# Patient Record
Sex: FEMALE | Race: WHITE | NOT HISPANIC OR LATINO | Employment: OTHER | URBAN - METROPOLITAN AREA
[De-identification: names, ages, dates, MRNs, and addresses within clinical notes are randomized per-mention and may not be internally consistent; named-entity substitution may affect disease eponyms.]

---

## 2017-01-04 DIAGNOSIS — I49.5 SSS (SICK SINUS SYNDROME) (HCC): Primary | ICD-10-CM

## 2017-01-04 RX ORDER — SODIUM CHLORIDE 0.9 % (FLUSH) 0.9 %
1-10 SYRINGE (ML) INJECTION AS NEEDED
Status: CANCELLED | OUTPATIENT
Start: 2017-01-04

## 2017-01-05 DIAGNOSIS — R00.2 PALPITATIONS: Primary | ICD-10-CM

## 2017-01-09 ENCOUNTER — RESULTS ENCOUNTER (OUTPATIENT)
Dept: CARDIOLOGY | Facility: CLINIC | Age: 67
End: 2017-01-09

## 2017-01-09 DIAGNOSIS — I49.5 SSS (SICK SINUS SYNDROME) (HCC): ICD-10-CM

## 2017-01-10 ENCOUNTER — APPOINTMENT (OUTPATIENT)
Dept: PREADMISSION TESTING | Facility: HOSPITAL | Age: 67
End: 2017-01-10

## 2017-01-10 LAB
ANION GAP SERPL CALCULATED.3IONS-SCNC: 5 MMOL/L (ref 3–11)
BUN BLD-MCNC: 16 MG/DL (ref 9–23)
BUN/CREAT SERPL: 20 (ref 7–25)
CALCIUM SPEC-SCNC: 9.9 MG/DL (ref 8.7–10.4)
CHLORIDE SERPL-SCNC: 102 MMOL/L (ref 99–109)
CO2 SERPL-SCNC: 34 MMOL/L (ref 20–31)
CREAT BLD-MCNC: 0.8 MG/DL (ref 0.6–1.3)
DEPRECATED RDW RBC AUTO: 40 FL (ref 37–54)
ERYTHROCYTE [DISTWIDTH] IN BLOOD BY AUTOMATED COUNT: 12.7 % (ref 11.3–14.5)
GFR SERPL CREATININE-BSD FRML MDRD: 72 ML/MIN/1.73
GLUCOSE BLD-MCNC: 295 MG/DL (ref 70–100)
HCT VFR BLD AUTO: 42 % (ref 34.5–44)
HGB BLD-MCNC: 14.2 G/DL (ref 11.5–15.5)
MAGNESIUM SERPL-MCNC: 2.7 MG/DL (ref 1.3–2.7)
MCH RBC QN AUTO: 29.1 PG (ref 27–31)
MCHC RBC AUTO-ENTMCNC: 33.8 G/DL (ref 32–36)
MCV RBC AUTO: 86.1 FL (ref 80–99)
PLATELET # BLD AUTO: 124 10*3/MM3 (ref 150–450)
PMV BLD AUTO: 10.8 FL (ref 6–12)
POTASSIUM BLD-SCNC: 4.3 MMOL/L (ref 3.5–5.5)
RBC # BLD AUTO: 4.88 10*6/MM3 (ref 3.89–5.14)
SODIUM BLD-SCNC: 141 MMOL/L (ref 132–146)
WBC NRBC COR # BLD: 5.21 10*3/MM3 (ref 3.5–10.8)

## 2017-01-10 PROCEDURE — 85027 COMPLETE CBC AUTOMATED: CPT | Performed by: PHYSICIAN ASSISTANT

## 2017-01-10 PROCEDURE — 83735 ASSAY OF MAGNESIUM: CPT | Performed by: PHYSICIAN ASSISTANT

## 2017-01-10 PROCEDURE — 80048 BASIC METABOLIC PNL TOTAL CA: CPT | Performed by: PHYSICIAN ASSISTANT

## 2017-01-10 PROCEDURE — 36415 COLL VENOUS BLD VENIPUNCTURE: CPT | Performed by: PHYSICIAN ASSISTANT

## 2017-01-10 RX ORDER — FLUCONAZOLE 150 MG/1
TABLET ORAL
COMMUNITY
Start: 2016-11-05 | End: 2017-01-10

## 2017-01-10 RX ORDER — CEFDINIR 300 MG/1
CAPSULE ORAL
COMMUNITY
Start: 2016-11-05 | End: 2017-01-10

## 2017-01-10 NOTE — DISCHARGE INSTRUCTIONS
The following instructions given during Pre Admission Testing visit:    Do not eat or drink anything after MN except for sips of water with your a.m. Prescription meds unless otherwise instructed by your physician.    Glasses and jewelry may be worn, but dentures must be removed prior to cath/procedure.    Leave any items you consider valuable at home.    Family members may wait in [facility] waiting area during procedure.    Bring all medications in their original containers the day of procedure.    Bring photo ID and insurance cards on the day of procedure.    Need to make arrangements for transportation prior to discharge.    The following handouts were given:     Heart Cath pathway (if applicable)   Cardiac Cath booklet published by [employer]    OR appropriate [facility] procedure booklet    If applicable, pt instructed to bring CPAP mask and tubing the day of procedure.  PPM book and wipes given

## 2017-01-11 ENCOUNTER — APPOINTMENT (OUTPATIENT)
Dept: CARDIOLOGY | Facility: HOSPITAL | Age: 67
End: 2017-01-11
Attending: INTERNAL MEDICINE

## 2017-01-11 ENCOUNTER — APPOINTMENT (OUTPATIENT)
Dept: PREADMISSION TESTING | Facility: HOSPITAL | Age: 67
End: 2017-01-11

## 2017-01-11 ENCOUNTER — HOSPITAL ENCOUNTER (OUTPATIENT)
Dept: CARDIOLOGY | Facility: HOSPITAL | Age: 67
Discharge: HOME | End: 2017-01-11
Attending: INTERNAL MEDICINE | Admitting: INTERNAL MEDICINE

## 2017-01-11 VITALS
SYSTOLIC BLOOD PRESSURE: 122 MMHG | BODY MASS INDEX: 32.1 KG/M2 | DIASTOLIC BLOOD PRESSURE: 59 MMHG | HEIGHT: 64 IN | WEIGHT: 188 LBS

## 2017-01-11 DIAGNOSIS — I48.0 PAROXYSMAL ATRIAL FIBRILLATION (HCC): ICD-10-CM

## 2017-01-11 LAB
BH CV ECHO MEAS - AO ROOT AREA (BSA CORRECTED): 1.3
BH CV ECHO MEAS - AO ROOT AREA: 4.9 CM^2
BH CV ECHO MEAS - AO ROOT DIAM: 2.5 CM
BH CV ECHO MEAS - BSA(HAYCOCK): 2 M^2
BH CV ECHO MEAS - BSA: 1.9 M^2
BH CV ECHO MEAS - BZI_BMI: 32.3 KILOGRAMS/M^2
BH CV ECHO MEAS - BZI_METRIC_HEIGHT: 162.6 CM
BH CV ECHO MEAS - BZI_METRIC_WEIGHT: 85.3 KG
BH CV ECHO MEAS - CONTRAST EF (2CH): 51.5 ML/M^2
BH CV ECHO MEAS - CONTRAST EF 4CH: 53.1 ML/M^2
BH CV ECHO MEAS - EDV(CUBED): 56.2 ML
BH CV ECHO MEAS - EDV(MOD-SP2): 33 ML
BH CV ECHO MEAS - EDV(MOD-SP4): 32 ML
BH CV ECHO MEAS - EDV(TEICH): 63.1 ML
BH CV ECHO MEAS - EF(CUBED): 63.8 %
BH CV ECHO MEAS - EF(MOD-SP2): 51.5 %
BH CV ECHO MEAS - EF(MOD-SP4): 53.1 %
BH CV ECHO MEAS - EF(TEICH): 56 %
BH CV ECHO MEAS - ESV(CUBED): 20.3 ML
BH CV ECHO MEAS - ESV(MOD-SP2): 16 ML
BH CV ECHO MEAS - ESV(MOD-SP4): 15 ML
BH CV ECHO MEAS - ESV(TEICH): 27.8 ML
BH CV ECHO MEAS - FS: 28.7 %
BH CV ECHO MEAS - IVS/LVPW: 1.1
BH CV ECHO MEAS - IVSD: 1.4 CM
BH CV ECHO MEAS - LA DIMENSION: 3.3 CM
BH CV ECHO MEAS - LA/AO: 1.3
BH CV ECHO MEAS - LAT PEAK E' VEL: 7.9 CM/SEC
BH CV ECHO MEAS - LV DIASTOLIC VOL/BSA (35-75): 16.8 ML/M^2
BH CV ECHO MEAS - LV MASS(C)D: 186.6 GRAMS
BH CV ECHO MEAS - LV MASS(C)DI: 97.9 GRAMS/M^2
BH CV ECHO MEAS - LV SYSTOLIC VOL/BSA (12-30): 7.9 ML/M^2
BH CV ECHO MEAS - LVIDD: 3.8 CM
BH CV ECHO MEAS - LVIDS: 2.7 CM
BH CV ECHO MEAS - LVLD AP2: 6.3 CM
BH CV ECHO MEAS - LVLD AP4: 5.7 CM
BH CV ECHO MEAS - LVLS AP2: 5.3 CM
BH CV ECHO MEAS - LVLS AP4: 4.8 CM
BH CV ECHO MEAS - LVPWD: 1.4 CM
BH CV ECHO MEAS - MED PEAK E' VEL: 5 CM/SEC
BH CV ECHO MEAS - MV A MAX VEL: 84.4 CM/SEC
BH CV ECHO MEAS - MV DEC TIME: 0.23 SEC
BH CV ECHO MEAS - MV E MAX VEL: 64.7 CM/SEC
BH CV ECHO MEAS - MV E/A: 0.77
BH CV ECHO MEAS - PA ACC SLOPE: 1025 CM/SEC^2
BH CV ECHO MEAS - PA ACC TIME: 0.09 SEC
BH CV ECHO MEAS - PA PR(ACCEL): 37.6 MMHG
BH CV ECHO MEAS - RAP SYSTOLE: 3 MMHG
BH CV ECHO MEAS - RVDD: 2.6 CM
BH CV ECHO MEAS - RVSP: 23.6 MMHG
BH CV ECHO MEAS - SI(CUBED): 18.8 ML/M^2
BH CV ECHO MEAS - SI(MOD-SP2): 8.9 ML/M^2
BH CV ECHO MEAS - SI(MOD-SP4): 8.9 ML/M^2
BH CV ECHO MEAS - SI(TEICH): 18.6 ML/M^2
BH CV ECHO MEAS - SV(CUBED): 35.8 ML
BH CV ECHO MEAS - SV(MOD-SP2): 17 ML
BH CV ECHO MEAS - SV(MOD-SP4): 17 ML
BH CV ECHO MEAS - SV(TEICH): 35.4 ML
BH CV ECHO MEAS - TAPSE (>1.6): 1.9 CM2
BH CV ECHO MEAS - TR MAX VEL: 227 CM/SEC
BH CV XLRA - RV BASE: 2.7 CM
BH CV XLRA - RV LENGTH: 5.5 CM
BH CV XLRA - RV MID: 2 CM
BH CV XLRA - TDI S': 12.8 CM/SEC
LEFT ATRIUM VOLUME INDEX: 24.6 ML/M2
LV EF 2D ECHO EST: 58 %

## 2017-01-11 PROCEDURE — 93306 TTE W/DOPPLER COMPLETE: CPT | Performed by: INTERNAL MEDICINE

## 2017-01-11 PROCEDURE — 93306 TTE W/DOPPLER COMPLETE: CPT

## 2017-01-12 ENCOUNTER — APPOINTMENT (OUTPATIENT)
Dept: CARDIOLOGY | Facility: HOSPITAL | Age: 67
End: 2017-01-12

## 2017-01-12 ENCOUNTER — HOSPITAL ENCOUNTER (OUTPATIENT)
Facility: HOSPITAL | Age: 67
Setting detail: OBSERVATION
Discharge: HOME | End: 2017-01-13
Attending: INTERNAL MEDICINE | Admitting: INTERNAL MEDICINE

## 2017-01-12 DIAGNOSIS — I49.5 SSS (SICK SINUS SYNDROME) (HCC): ICD-10-CM

## 2017-01-12 DIAGNOSIS — I48.0 PAROXYSMAL ATRIAL FIBRILLATION (HCC): ICD-10-CM

## 2017-01-12 LAB — HBA1C MFR BLD: 9.9 % (ref 4.8–5.6)

## 2017-01-12 PROCEDURE — C1785 PMKR, DUAL, RATE-RESP: HCPCS | Performed by: INTERNAL MEDICINE

## 2017-01-12 PROCEDURE — 99152 MOD SED SAME PHYS/QHP 5/>YRS: CPT | Performed by: INTERNAL MEDICINE

## 2017-01-12 PROCEDURE — 25010000003 CEFAZOLIN IN DEXTROSE 2-4 GM/100ML-% SOLUTION: Performed by: INTERNAL MEDICINE

## 2017-01-12 PROCEDURE — 33208 INSRT HEART PM ATRIAL & VENT: CPT | Performed by: INTERNAL MEDICINE

## 2017-01-12 PROCEDURE — C1892 INTRO/SHEATH,FIXED,PEEL-AWAY: HCPCS | Performed by: INTERNAL MEDICINE

## 2017-01-12 PROCEDURE — 25010000002 FENTANYL CITRATE (PF) 100 MCG/2ML SOLUTION: Performed by: INTERNAL MEDICINE

## 2017-01-12 PROCEDURE — 83036 HEMOGLOBIN GLYCOSYLATED A1C: CPT | Performed by: PHYSICIAN ASSISTANT

## 2017-01-12 PROCEDURE — 25010000003 CEFAZOLIN IN DEXTROSE 2-4 GM/100ML-% SOLUTION: Performed by: PHYSICIAN ASSISTANT

## 2017-01-12 PROCEDURE — 25010000002 ONDANSETRON PER 1 MG: Performed by: INTERNAL MEDICINE

## 2017-01-12 PROCEDURE — 93010 ELECTROCARDIOGRAM REPORT: CPT | Performed by: INTERNAL MEDICINE

## 2017-01-12 PROCEDURE — 25010000002 HEPARIN (PORCINE) PER 1000 UNITS: Performed by: INTERNAL MEDICINE

## 2017-01-12 PROCEDURE — A4565 SLINGS: HCPCS | Performed by: INTERNAL MEDICINE

## 2017-01-12 PROCEDURE — 99153 MOD SED SAME PHYS/QHP EA: CPT | Performed by: INTERNAL MEDICINE

## 2017-01-12 PROCEDURE — G0378 HOSPITAL OBSERVATION PER HR: HCPCS

## 2017-01-12 PROCEDURE — 0 IOPAMIDOL PER 1 ML: Performed by: INTERNAL MEDICINE

## 2017-01-12 PROCEDURE — C1898 LEAD, PMKR, OTHER THAN TRANS: HCPCS | Performed by: INTERNAL MEDICINE

## 2017-01-12 PROCEDURE — 25010000002 MIDAZOLAM PER 1 MG: Performed by: INTERNAL MEDICINE

## 2017-01-12 PROCEDURE — 93005 ELECTROCARDIOGRAM TRACING: CPT | Performed by: INTERNAL MEDICINE

## 2017-01-12 DEVICE — STEROX BIPOLAR IS-1 ATRIAL/VENTRICULAR
Type: IMPLANTABLE DEVICE | Status: FUNCTIONAL
Brand: FINELINE® II EZ STEROX

## 2017-01-12 DEVICE — IMPLANTABLE DEVICE: Type: IMPLANTABLE DEVICE | Status: FUNCTIONAL

## 2017-01-12 DEVICE — PACEMAKER
Type: IMPLANTABLE DEVICE | Status: FUNCTIONAL
Brand: ESSENTIO™ MRI DR

## 2017-01-12 RX ORDER — SODIUM CHLORIDE 0.9 % (FLUSH) 0.9 %
1-10 SYRINGE (ML) INJECTION AS NEEDED
Status: DISCONTINUED | OUTPATIENT
Start: 2017-01-12 | End: 2017-01-13 | Stop reason: HOSPADM

## 2017-01-12 RX ORDER — CEFAZOLIN SODIUM 2 G/100ML
2 INJECTION, SOLUTION INTRAVENOUS EVERY 8 HOURS
Status: COMPLETED | OUTPATIENT
Start: 2017-01-12 | End: 2017-01-13

## 2017-01-12 RX ORDER — ACETAMINOPHEN 325 MG/1
650 TABLET ORAL EVERY 4 HOURS PRN
Status: DISCONTINUED | OUTPATIENT
Start: 2017-01-12 | End: 2017-01-13 | Stop reason: HOSPADM

## 2017-01-12 RX ORDER — ONDANSETRON 2 MG/ML
INJECTION INTRAMUSCULAR; INTRAVENOUS AS NEEDED
Status: DISCONTINUED | OUTPATIENT
Start: 2017-01-12 | End: 2017-01-12 | Stop reason: HOSPADM

## 2017-01-12 RX ORDER — MIDAZOLAM HYDROCHLORIDE 1 MG/ML
INJECTION INTRAMUSCULAR; INTRAVENOUS AS NEEDED
Status: DISCONTINUED | OUTPATIENT
Start: 2017-01-12 | End: 2017-01-12 | Stop reason: HOSPADM

## 2017-01-12 RX ORDER — ONDANSETRON 2 MG/ML
4 INJECTION INTRAMUSCULAR; INTRAVENOUS EVERY 6 HOURS PRN
Status: DISCONTINUED | OUTPATIENT
Start: 2017-01-12 | End: 2017-01-13 | Stop reason: HOSPADM

## 2017-01-12 RX ORDER — FENTANYL CITRATE 50 UG/ML
INJECTION, SOLUTION INTRAMUSCULAR; INTRAVENOUS AS NEEDED
Status: DISCONTINUED | OUTPATIENT
Start: 2017-01-12 | End: 2017-01-12 | Stop reason: HOSPADM

## 2017-01-12 RX ORDER — SODIUM CHLORIDE 9 MG/ML
INJECTION, SOLUTION INTRAVENOUS CONTINUOUS PRN
Status: DISCONTINUED | OUTPATIENT
Start: 2017-01-12 | End: 2017-01-12 | Stop reason: HOSPADM

## 2017-01-12 RX ORDER — PANTOPRAZOLE SODIUM 40 MG/1
40 TABLET, DELAYED RELEASE ORAL
Status: DISCONTINUED | OUTPATIENT
Start: 2017-01-12 | End: 2017-01-13 | Stop reason: HOSPADM

## 2017-01-12 RX ORDER — LIDOCAINE HYDROCHLORIDE 10 MG/ML
INJECTION, SOLUTION INFILTRATION; PERINEURAL AS NEEDED
Status: DISCONTINUED | OUTPATIENT
Start: 2017-01-12 | End: 2017-01-12 | Stop reason: HOSPADM

## 2017-01-12 RX ORDER — BUPIVACAINE HYDROCHLORIDE 5 MG/ML
INJECTION, SOLUTION EPIDURAL; INTRACAUDAL AS NEEDED
Status: DISCONTINUED | OUTPATIENT
Start: 2017-01-12 | End: 2017-01-12 | Stop reason: HOSPADM

## 2017-01-12 RX ORDER — OXYCODONE HYDROCHLORIDE AND ACETAMINOPHEN 5; 325 MG/1; MG/1
1 TABLET ORAL EVERY 4 HOURS PRN
Status: DISCONTINUED | OUTPATIENT
Start: 2017-01-12 | End: 2017-01-13 | Stop reason: HOSPADM

## 2017-01-12 RX ORDER — NAPROXEN SODIUM 220 MG
440 TABLET ORAL 2 TIMES DAILY PRN
COMMUNITY
End: 2018-05-14

## 2017-01-12 RX ORDER — CEFAZOLIN SODIUM 2 G/100ML
2 INJECTION, SOLUTION INTRAVENOUS
Status: COMPLETED | OUTPATIENT
Start: 2017-01-12 | End: 2017-01-12

## 2017-01-12 RX ORDER — SODIUM CHLORIDE 0.9 % (FLUSH) 0.9 %
1-10 SYRINGE (ML) INJECTION AS NEEDED
Status: DISCONTINUED | OUTPATIENT
Start: 2017-01-12 | End: 2017-01-12 | Stop reason: HOSPADM

## 2017-01-12 RX ORDER — ASPIRIN 81 MG/1
81 TABLET ORAL DAILY
Status: DISCONTINUED | OUTPATIENT
Start: 2017-01-12 | End: 2017-01-13 | Stop reason: HOSPADM

## 2017-01-12 RX ADMIN — ACETAMINOPHEN 650 MG: 325 TABLET, FILM COATED ORAL at 20:38

## 2017-01-12 RX ADMIN — CEFAZOLIN SODIUM 2 G: 2 INJECTION, SOLUTION INTRAVENOUS at 09:17

## 2017-01-12 RX ADMIN — PANTOPRAZOLE SODIUM 40 MG: 40 TABLET, DELAYED RELEASE ORAL at 08:15

## 2017-01-12 RX ADMIN — ACETAMINOPHEN 650 MG: 325 TABLET, FILM COATED ORAL at 11:23

## 2017-01-12 RX ADMIN — CEFAZOLIN SODIUM 2 G: 2 INJECTION, SOLUTION INTRAVENOUS at 18:51

## 2017-01-12 NOTE — H&P (VIEW-ONLY)
"Angela BALL [place]  1950  [phone]      12/28/2016    [facility] CARDIOLOGY     Fransico Baez MD  [address] [place] [address] [address] [place] [address]    Chief Complaint   Patient presents with   • Atrial Fibrillation       Problem List:   1. Paroxysmal atrial fibrillation/atrial tachycardia with sick sinus syndrome, bradycardia in the past  a.  Onset of atrial fibrillation in 2004.   b. Underwent trial of multiple medications, which were discontinued due to intolerance or unresponsiveness.   c. Electrophysiology study by Dr. Mayen. 01/20/2005, showing evidence of focal epicardial, atrial  fibrillation emanating from the common pulmonary veins and probably of 1 of the tributaries. Subsequently, ablation procedure was performed, but was unsuccessful according to the patient.  Complication with procedure, resulting in cardiac tamponade.   Patient is status post pericardiocentesis.    d. Recurrence of bouts of paroxysmal atrial fibrillation recently with good response to Cardizem.   e. 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 There was transient complete heart  block with normalization of conduction at the time of the ablation.  Patient currently now in normal sinus rhythm with completely normalization of conduction with HI interval at 130 ms.     f. Event Monitor 11/19 to 12/29 with epiosdes of mobitz II with no prolongation of PP prior to the dizziness spells.   2. Supraventricular tachycardia, rate 190, symptomatic, November 2015:  a. Status post AVNRT, common type ablation on 03/24/2016, with transient complete heart block, but normalized. Prior to this procedure 1 st degree AV block    3. Frequent short episodes of SVT presenting with a \"weak spells.\"   4. Chest pain, negative emergency  room evaluation in 2014.     5. Recent diagnosis of hypertension.   6. Dyslipidemia/hypertriglyceridemia.   7. Family history of heart disease.   8. GERD: " "  a. Status post Nissen fundoplication x2, with resultant esophageal narrowing.    9. Chronic cough, felt to be related to aspiration.   10. Surgical history:   a. Hysterectomy at the age of 30.   b. Nissen fundoplication x2, 10 years ago and repeated 5 years ago.   c. Right leg lipoma resection x7.     Allergies  Allergies   Allergen Reactions   • Codeine    • Dilaudid [Hydromorphone Hcl] Hallucinations   • Percocet [Oxycodone-Acetaminophen] Hallucinations   • Sulfa Antibiotics        Current Medications    Current Outpatient Prescriptions:   •  aspirin 81 MG EC tablet, Take 81 mg by mouth daily., Disp: , Rfl:   •  ibuprofen (ADVIL,MOTRIN) 200 MG tablet, Take 200 mg by mouth Every 6 (Six) Hours As Needed for mild pain (1-3)., Disp: , Rfl:   •  omeprazole (priLOSEC) 40 MG capsule, Take 40 mg by mouth Daily. Make take 1 additional tablet if needed, Disp: , Rfl:     History of Present Illness   HPI    Pt presents for follow up of PAF s/p ablation, AVNRT s/p ablation  With continued palpitations.  Since we last saw the pt she was in the ER at [facility] with palpitations, dizziness and and Mobitz Type II documented on EKG Nov 2 nd with no prolongation of PP prior to blocked beats- she subsequently had an event monitor placed for 30 days. She is having the spells several days / month. She has not truly  passed out. Complaining of dizziness and fatigue. No chest pain, syncope, fever, chills and night sweats. No recent ER visits since Nov 2nd.     ROS:  General:  Denies fatigue, weight gain or loss  Cardiovascular:  Denies CP, PND, syncope, near syncope, edema or + palpitations.  Pulmonary:  Denies RIOS, cough, or wheezing      Vitals:    12/28/16 1036   BP: 128/78   BP Location: Left arm   Patient Position: Sitting   Pulse: 82   Weight: 192 lb (87.1 kg)   Height: 64\" (162.6 cm)     PE:  NAD  Neck: no JVD, no carotid bruits, no TM  Heart RRR, NL S1, S2, S4 present, no rubs, murmurs, skipped beats  Lungs: " CTA  Abd: soft, non-tender, NL BS  Ext: No musculoskeletal deformities    Diagnostic Data:  Procedures     EKG in the past: NSR with mobitz II block with no PP prolongation prior with symptoms of dizziness, fatigue.     1. Paroxysmal atrial fibrillation s/p PVA   2. Essential hypertension    3. SVT (supraventricular tachycardia) s/p AVNRT ablation in the past   4. Mobitz type II atrioventricular block symptomatic      Plan:  1) AF s/p ablation 2005- no recurrence   2) AVNRT- s/p ablation 3/2016 - no recurrence  3) Mobitz type II documented on Event Monitor and EKG with symptoms of dizziness and worsening fatigue, weakness on no AV blaise agents and no AAD. Normal EF in the past. I think best to proceed with a DDD PPM to help with her symptomatic high degree AV block causing symptomatic bradycardia. The risks, benefits, and alternatives of the procedure have been reviewed and the patient wishes to proceed.         Scribed for Jhon Vivas DO by MARYJANE Maynard. 12/28/2016  11:32 AM      IJhon have reviewed the note in full and agree with all aspects of the above including physical exam, assessment, labs and plan with changes made accordingly.     Jhon Vivas DO  12/28/16  11:38 AM

## 2017-01-12 NOTE — IP AVS SNAPSHOT
AFTER VISIT SUMMARY             Angela Tovar           About your hospitalization     You were admitted on:  January 12, 2017 You last received care in the:  [facility]       Procedures & Surgeries      Procedure(s) (LRB):  Pacemaker DC new (N/A)     1/12/2017     Surgeon(s):  Jhon Vivas, DO  -------------------      Medications    If you or your caregiver advised us that you are currently taking a medication and that medication is marked below as “Resume”, this simply indicates that we have reviewed those medications to make sure our new therapy recommendations do not interfere.  If you have concerns about medications other than those new ones which we are prescribing today, please consult the physician who prescribed them (or your primary physician).  Our review of your home medications is not meant to indicate that we are directing their use.             Your Medications      CONTINUE taking these medications     aspirin 81 MG EC tablet   Take 81 mg by mouth daily.           ibuprofen 200 MG tablet   Take 200 mg by mouth Every 6 (Six) Hours As Needed for mild pain (1-3).   Commonly known as:  ADVIL,MOTRIN           naproxen sodium 220 MG tablet   Take 440 mg by mouth 2 (Two) Times a Day As Needed for mild pain (1-3).   Commonly known as:  ALEVE           omeprazole 40 MG capsule   Take 40 mg by mouth Daily. Make take 1 additional tablet if needed   Commonly known as:  priLOSEC                      Your Medications      Your Medication List           Morning Noon Evening Bedtime As Needed    aspirin 81 MG EC tablet   Take 81 mg by mouth daily.                                ibuprofen 200 MG tablet   Take 200 mg by mouth Every 6 (Six) Hours As Needed for mild pain (1-3).   Commonly known as:  ADVIL,MOTRIN                                naproxen sodium 220 MG tablet   Take 440 mg by mouth 2 (Two) Times a Day As Needed for mild pain (1-3).   Commonly known as:  ALEVE                                   omeprazole 40 MG capsule   Take 40 mg by mouth Daily. Make take 1 additional tablet if needed   Commonly known as:  priLOSEC                                         Instructions for After Discharge        Discharge References/Attachments     PACEMAKER IMPLANTATION (ENGLISH)       Follow-ups for After Discharge        Follow-up Information     Follow up with Jhon Vivas DO .    Specialties:  Cardiology, Cardiac Electrophysiology    Why:  wound check 7-10 days. Follow-up 10-12 weeks.     Contact information:    [address]  [facility] [address]  [phone]        Scheduled Appointments     Jan 20, 2017 11:30 AM EST   Wound Check with WOUND CHECK   [facility] CARDIOLOGY (--)    [address]  [facility] [address]   [phone]            Mar 27, 2017  1:15 PM EDT   Follow Up with Jhon Vivas DO   [facility] CARDIOLOGY (--)    [address]  [facility] [address]   [phone]           Arrive 15 minutes prior to appointment.              [employer] Signup     Our records indicate that you have an active [employer] account.    You can view your After Visit Summary by going to [employer] and logging in with your [employer] username and password.  If you don't have a [employer] username and password but a parent or guardian has access to your record, the parent or guardian should login with their own [employer] username and password and access your record to view the After Visit Summary.    If you have questions, you can email [email or web address] or call [phone] to talk to our [employer] staff.  Remember, [employer] is NOT to be used for urgent needs.  For medical emergencies, dial 911.           Summary of Your Hospitalization        Reason for Hospitalization     Your primary diagnosis was:  Not on File    Your diagnoses also included:  Atrial Fibrillation (Irregular  Heartbeat)      Care Providers     Provider Service Role Specialty    Jhon Vivas,  Cardiology Attending Provider Cardiology      Your Allergies  Date Reviewed: 1/12/2017    Allergen Reactions    Codeine Nausea And Vomiting         Dilaudid (Hydromorphone Hcl) Hallucinations         Percocet (Oxycodone-Acetaminophen) Hallucinations         Sulfa Antibiotics Rash      Patient Belongings Returned     Document Return of Belongings Flowsheet     Were the patient bedside belongings sent home?   --   Belongings Retrieved from Security & Sent Home   --    Belongings Sent to Safe   --   Medications Retrieved from Pharmacy & Sent Home   --              More Information      Pacemaker Implantation  The heart has its own electrical system, or natural pacemaker, to regulate the heartbeat. Sometimes, the natural pacemaker system of the heart fails and causes the heart to beat too slowly. If this happens, a pacemaker can be surgically placed to help the heart beat at a normal or programmed rate. A pacemaker is a small, battery-powered device that is placed under the skin and is programmed to sense your heartbeats. If your heart rate is lower than the programmed rate, the pacemaker will pace your heart. Parts of a pacemaker include:  · Wires or leads. The leads are placed in the heart and transmit electricity to the heart. The leads are connected to the pulse generator.  · Pulse generator. The pulse generator contains a computer and a memory system. The pulse generator also produces the electrical signal that triggers the heart to beat.  A pacemaker may be placed if:  · You have a slow heartbeat (bradycardia).  · You have fainting (syncope).  · Shortness of breath (dyspnea) due to heart problems.  LET YOUR HEALTH CARE PROVIDER KNOW ABOUT:  · Any allergies you may have.  · All medicines you are taking, including vitamins, herbs, eye drops, creams, and over-the-counter medicines.  · Previous problems you or members of your  family have had with the use of anesthetics.  · Any blood disorders you have.  · Previous surgeries you have had.  · Medical conditions you have.  · Possibility of pregnancy, if this applies.  RISKS AND COMPLICATIONS  Generally, pacemaker implantation is a safe procedure. However, problems can occur and include:  · Bleeding.  · Unable to place the pacemaker under local sedation.  · Infection.  BEFORE THE PROCEDURE  · You will have blood work drawn before the procedure.  · Do not use any tobacco products including cigarettes, chewing tobacco, or electronic cigarettes. If you need help quitting, ask your health care provider.  · Do not eat or drink anything after midnight on the night before the procedure or as directed by your health care provider.  · Ask your health care provider about:  ¨ Changing or stopping your regular medicines. This is especially important if you are taking diabetes medicines or blood thinners.  ¨ Taking medicines such as aspirin and ibuprofen. These medicines can thin your blood. Do not take these medicines before your procedure if your health care provider asks you not to.  · Ask your health care provider if you can take a sip of water with any approved medicines the morning of the procedure.  PROCEDURE   The surgery to place a pacemaker is considered a minimally invasive surgical procedure. It is done under a local anesthetic, which is an injection at the incision site that makes the skin numb. You are also given sedation and pain medicine that makes you drowsy during the procedure.   · An intravenous line (IV) will be started in your hand or arm so sedation and pain medicine can be given during the pacemaker procedure.  · A numbing medicine will be injected into the skin where the pacemaker is to be placed. A small incision will then be made into the skin. The pacemaker is usually placed under the skin near the collarbone.  · After the incision has been made, the leads will be inserted  into a large vein and guided into the heart using X-ray.  · Using the same incision that was used to place the leads, a small pocket will be created under the skin to hold the pulse generator. The leads will then be connected to the pulse generator.  · The incision site will then be closed. A bandage (dressing) is placed over the pacemaker site. The dressing is removed 24-48 hours afterward.  AFTER THE PROCEDURE  · You will be taken to a recovery area after the pacemaker implant. Your vital signs such as blood pressure, heart rate, breathing, and oxygen levels will be monitored.  · A chest X-ray will be done after the pacemaker has been implanted. This is to make sure the pacemaker and leads are in the correct place.     This information is not intended to replace advice given to you by your health care provider. Make sure you discuss any questions you have with your health care provider.     Document Released: 12/08/2003 Document Revised: 01/08/2016 Document Reviewed: 04/23/2013  [employer] Interactive Patient Education ©2016 [employer]         PREVENTING SURGICAL SITE INFECTIONS     Surgical Site Infections FAQs  What is a Surgical Site Infection (SSI)?  A surgical site infection is an infection that occurs after surgery in the part of the body where the surgery took place. Most patients who have surgery do not develop an infection. However, infections develop in about 1 to 3 out of every 100 patients who have surgery.  Some of the common symptoms of a surgical site infection are:  · Redness and pain around the area where you had surgery  · Drainage of cloudy fluid from your surgical wound  · Fever  Can SSIs be treated?  Yes. Most surgical site infections can be treated with antibiotics. The antibiotic given to you depends on the bacteria (germs) causing the infection. Sometimes patients with SSIs also need another surgery to treat the infection.  What are some of the things that hospitals are doing to prevent  SSIs?  To prevent SSIs, doctors, nurses, and other healthcare providers:  · Clean their hands and arms up to their elbows with an antiseptic agent just before the surgery.  · Clean their hands with soap and water or an alcohol-based hand rub before and after caring for each patient.  · May remove some of your hair immediately before your surgery using electric clippers if the hair is in the same area where the procedure will occur. They should not shave you with a razor.  · Wear special hair covers, masks, gowns, and gloves during surgery to keep the surgery area clean.  · Give you antibiotics before your surgery starts. In most cases, you should get antibiotics within 60 minutes before the surgery starts and the antibiotics should be stopped within 24 hours after surgery.  · Clean the skin at the site of your surgery with a special soap that kills germs.  What can I do to help prevent SSIs?  Before your surgery:  · Tell your doctor about other medical problems you may have. Health problems such as allergies, diabetes, and obesity could affect your surgery and your treatment.  · Quit smoking. Patients who smoke get more infections. Talk to your doctor about how you can quit before your surgery.  · Do not shave near where you will have surgery. Shaving with a razor can irritate your skin and make it easier to develop an infection.  At the time of your surgery:  · Speak up if someone tries to shave you with a razor before surgery. Ask why you need to be shaved and talk with your surgeon if you have any concerns.  · Ask if you will get antibiotics before surgery.  After your surgery:  · Make sure that your healthcare providers clean their hands before examining you, either with soap and water or an alcohol-based hand rub.    If you do not see your providers clean their hands, please ask them to do so.  · Family and friends who visit you should not touch the surgical wound or dressings.  · Family and friends should  clean their hands with soap and water or an alcohol-based hand rub before and after visiting you. If you do not see them clean their hands, ask them to clean their hands.  What do I need to do when I go home from the hospital?  · Before you go home, your doctor or nurse should explain everything you need to know about taking care of your wound. Make sure you understand how to care for your wound before you leave the hospital.  · Always clean your hands before and after caring for your wound.  · Before you go home, make sure you know who to contact if you have questions or problems after you get home.  · If you have any symptoms of an infection, such as redness and pain at the surgery site, drainage, or fever, call your doctor immediately.  If you have additional questions, please ask your doctor or nurse.  Developed and co-sponsored by The Society for Healthcare Epidemiology of [place] (SHEA); Infectious Diseases Society of [place] (IDSA); American Hospital Association; Association for Professionals in Infection Control and Epidemiology (APIC); Centers for Disease Control and Prevention (CDC); and The Joint Commission.     This information is not intended to replace advice given to you by your health care provider. Make sure you discuss any questions you have with your health care provider.     Document Released: 12/23/2014 Document Revised: 01/08/2016 Document Reviewed: 03/02/2016  [employer] Interactive Patient Education ©2016 [employer]             SYMPTOMS OF A STROKE    Call 911 or have someone take you to the Emergency Department if you have any of the following:    · Sudden numbness or weakness of your face, arm or leg especially on one side of the body  · Sudden confusion, diffiiculty speaking or trouble understanding   · Changes in your vision or loss of sight in one eye  · Sudden severe headache with no known cause  · sudden dizziness, trouble walking, loss of balance or coordination    It is important to  seek emergency care right away if you have further stroke symptoms. If you get emergency help quickly, the powerful clot-dissolving medicines can reduce the disabilities caused by a stroke.     For more information:    American Stroke Association  5-934-0-STROKE  www.strokeassociation.org           IF YOU SMOKE OR USE TOBACCO PLEASE READ THE FOLLOWING:    Why is smoking bad for me?  Smoking increases the risk of heart disease, lung disease, vascular disease, stroke, and cancer.     If you smoke, STOP!    If you would like more information on quitting smoking, please visit the [employer] website: www.[email or web address]/healthier-together/smoke   This link will provide additional resources including the QUIT line and the Beat the Pack support groups.     For more information:    American Cancer Society  (615) 825-4239    American Heart Association  1-748.594.7733               YOU ARE THE MOST IMPORTANT FACTOR IN YOUR RECOVERY.     Follow all instructions carefully.     I have reviewed my discharge instructions with my nurse, including the following information, if applicable:     Information about my illness and diagnosis   Follow up appointments (including lab draws)   Wound Care   Equipment Needs   Medications (new and continuing) along with side effects   Preventative information such as vaccines and smoking cessations   Diet   Pain   I know when to contact my Doctor's office or seek emergency care      I want my nurse to describe the side effects of my medications: YES NO   If the answer is no, I understand the side effects of my medications: YES NO   My nurse described the side effects of my medications in a way that I could understand: YES NO   I have taken my personal belongings and my own medications with me at discharge: YES NO            I have received this information and my questions have been answered. I have discussed any concerns I see with this plan with the nurse or physician. I  understand these instructions.    Signature of Patient or Responsible Person: _____________________________________    Date: _________________  Time: __________________    Signature of Healthcare Provider: _______________________________________  Date: _________________  Time: __________________

## 2017-01-12 NOTE — INTERVAL H&P NOTE
"  H&P reviewed. The patient was examined and there are no changes to the H&P.    Vitals:    01/12/17 0630 01/12/17 0632   BP: 142/91 151/91   BP Location: Right arm Left arm   Patient Position: Lying Lying   Pulse: 93    Resp: 18    Temp: 98.4 °F (36.9 °C)    TempSrc: Tympanic    SpO2: 95%    Weight: 190 lb 14.7 oz (86.6 kg)    Height: 64\" (162.6 cm)      Results Review:     I reviewed the patient's new clinical results.      Results from last 7 days  Lab Units 01/10/17  1238   WBC 10*3/mm3 5.21   HEMOGLOBIN g/dL 14.2   HEMATOCRIT % 42.0   PLATELETS 10*3/mm3 124*       Results from last 7 days  Lab Units 01/10/17  1238   SODIUM mmol/L 141   POTASSIUM mmol/L 4.3   CHLORIDE mmol/L 102   TOTAL CO2 mmol/L 34.0*   BUN mg/dL 16   CREATININE mg/dL 0.80   CALCIUM mg/dL 9.9   GLUCOSE mg/dL 295*                         A/P  1. AV Block- Mobitz type II, SSS with issues with sinus arrest  - patient will go for DDD PPM today. Risks, benefits, and alternatives have been discussed and patient wishes to proceed.     2. Hyperglycemia   - will check A1c.     Recurrent symptomatic sinus bradycardia due to SSS and intermittent episodes of high degree Av block. Normal EF. Here for DDD PPM.    IJhon have reviewed the note in full and agree with all aspects of the above including physical exam, assessment, labs and plan with changes made accordingly.     Jhon Vivas,   01/12/17  11:06 AM      "

## 2017-01-12 NOTE — Clinical Note
H&P note has been confirmed for the patient. Procedural consent has been signed.  Staff has reviewed the patients labs.

## 2017-01-13 ENCOUNTER — APPOINTMENT (OUTPATIENT)
Dept: GENERAL RADIOLOGY | Facility: HOSPITAL | Age: 67
End: 2017-01-13

## 2017-01-13 VITALS
HEART RATE: 87 BPM | SYSTOLIC BLOOD PRESSURE: 142 MMHG | TEMPERATURE: 98.4 F | WEIGHT: 190.92 LBS | BODY MASS INDEX: 32.59 KG/M2 | HEIGHT: 64 IN | RESPIRATION RATE: 9 BRPM | OXYGEN SATURATION: 94 % | DIASTOLIC BLOOD PRESSURE: 86 MMHG

## 2017-01-13 PROCEDURE — 93005 ELECTROCARDIOGRAM TRACING: CPT | Performed by: INTERNAL MEDICINE

## 2017-01-13 PROCEDURE — 71020 HC CHEST PA AND LATERAL: CPT

## 2017-01-13 PROCEDURE — 93010 ELECTROCARDIOGRAM REPORT: CPT | Performed by: INTERNAL MEDICINE

## 2017-01-13 PROCEDURE — G0378 HOSPITAL OBSERVATION PER HR: HCPCS

## 2017-01-13 PROCEDURE — 99024 POSTOP FOLLOW-UP VISIT: CPT | Performed by: INTERNAL MEDICINE

## 2017-01-13 PROCEDURE — 25010000003 CEFAZOLIN IN DEXTROSE 2-4 GM/100ML-% SOLUTION: Performed by: INTERNAL MEDICINE

## 2017-01-13 RX ADMIN — CEFAZOLIN SODIUM 2 G: 2 INJECTION, SOLUTION INTRAVENOUS at 01:59

## 2017-01-13 RX ADMIN — PANTOPRAZOLE SODIUM 40 MG: 40 TABLET, DELAYED RELEASE ORAL at 05:25

## 2017-01-13 RX ADMIN — ACETAMINOPHEN 650 MG: 325 TABLET, FILM COATED ORAL at 09:22

## 2017-01-13 NOTE — PLAN OF CARE
Problem: Patient Care Overview (Adult)  Goal: Plan of Care Review  Outcome: Ongoing (interventions implemented as appropriate)    01/13/17 0857   Coping/Psychosocial Response Interventions   Plan Of Care Reviewed With patient   Patient Care Overview   Progress improving       Goal: Adult Individualization and Mutuality  Outcome: Ongoing (interventions implemented as appropriate)  Goal: Discharge Needs Assessment  Outcome: Ongoing (interventions implemented as appropriate)    Problem: Cardiac Rhythm Management Device (Adult)  Goal: Signs and Symptoms of Listed Potential Problems Will be Absent or Manageable (Cardiac Rhythm Management Device)  Outcome: Ongoing (interventions implemented as appropriate)

## 2017-01-13 NOTE — PROGRESS NOTES
[facility] at [facility]  Cardiovascular Progress Note  Angela Tovar  2520/1  [id number]  1950    DATE OF ADMISSION: 1/12/2017  DATE OF FOLLOW UP:  1/13/17    Fransico Baez MD    Subjective:     Patient ID: Angela Tovar is a 66 y.o. female.    Chief Complaint: PM implant f/u     Allergies   Allergen Reactions   • Codeine Nausea And Vomiting   • Dilaudid [Hydromorphone Hcl] Hallucinations   • Percocet [Oxycodone-Acetaminophen] Hallucinations   • Sulfa Antibiotics Rash       Current Facility-Administered Medications:   •  acetaminophen (TYLENOL) tablet 650 mg, 650 mg, Oral, Q4H PRN, Jhon Ortega, DO, 650 mg at 01/12/17 2038  •  aspirin EC tablet 81 mg, 81 mg, Oral, Daily, ISAIAH Stanley, 81 mg at 01/12/17 0815  •  ondansetron (ZOFRAN) injection 4 mg, 4 mg, Intravenous, Q6H PRN, Jhon Ortega, DO  •  oxyCODONE-acetaminophen (PERCOCET) 5-325 MG per tablet 1 tablet, 1 tablet, Oral, Q4H PRN, Jhon Ortega, DO  •  pantoprazole (PROTONIX) EC tablet 40 mg, 40 mg, Oral, Q AM, ISAIAH Stanley, 40 mg at 01/13/17 0525  •  sodium chloride 0.9 % flush 1-10 mL, 1-10 mL, Intravenous, PRN, Jhon Ortega, DO    History of Present Illness    Feeling well. Has ambulated without issue this morning. No dizziness, SOA, or chest pain.     Doing well post PPM.     ROS   14 point ROS negative except as outlined in problem list, HPI and other parts of the note.    Procedures       Objective:       Vitals:    01/13/17 0205 01/13/17 0300 01/13/17 0410 01/13/17 0610   BP: 117/65  112/72 108/63   BP Location:       Patient Position:       Pulse:  91 83 81   Resp:       Temp:       TempSrc:       SpO2: 94% 93% 90% 91%   Weight:       Height:           Intake/Output Summary (Last 24 hours) at 01/13/17 0803  Last data filed at 01/13/17 0000   Gross per 24 hour   Intake    360 ml   Output      0 ml   Net    360 ml       GENERAL: Well-developed, well-nourished patient in no acute distress.  HEENT:  Normocephalic, atraumatic, PERRLA. Moist mucous membranes.  NECK: No JVD present at 30°. No carotid bruits auscultated.  LUNGS: Clear to auscultation.  CARDIOVASCULAR: Heart has a regular rate and rhythm. No murmurs, gallops or rubs noted.   ABDOMEN: Soft, nontender. Positive bowel sounds.  MUSCULOSKELETAL: No gross deformities. No clubbing, cyanosis  EXT: pulses intact, no swelling  SKIN: Pink, warm  Neuro: Nonfocal exam. Gait intact  PPM site ok  The patient's old records including ambulatory rhythm recordings (ECGs, Holter/event monitor) were reviewed and discussed.      Lab Review:     Results from last 7 days  Lab Units 01/10/17  1238   SODIUM mmol/L 141   POTASSIUM mmol/L 4.3   CHLORIDE mmol/L 102   TOTAL CO2 mmol/L 34.0*   BUN mg/dL 16   CREATININE mg/dL 0.80   GLUCOSE mg/dL 295*   CALCIUM mg/dL 9.9           Results from last 7 days  Lab Units 01/10/17  1238   WBC 10*3/mm3 5.21   HEMOGLOBIN g/dL 14.2   HEMATOCRIT % 42.0   PLATELETS 10*3/mm3 124*               Results from last 7 days  Lab Units 01/10/17  1238   MAGNESIUM mg/dL 2.7         CXR: good lead placement. No PTX.           Assessment & Plan:     1. Symptomatic Bradycardia due to SSS and high degree AV block  - patient is s/p DDD PPM implant yesterday. CXR looks good. No PTX i can see. Leads in good position.     2. Diabetes Mellitus- new onset   - A1c is 9.9. Informed patient of new diagnosis and she stated it is possible for her to get in with her PCP next week to begin treatment. Counseled on dietary modifications and exercise.     Patient will be discharged home in stable condition. Wound check in 7-10 days and follow-up in 10-12 weeks.     ISAIAH Schroeder  01/13/17  8:03 AM    IJhon have reviewed the note in full and agree with all aspects of the above including physical exam, assessment, labs and plan with changes made accordingly.     Jhon Vivas DO  01/13/17  9:08 AM          EMR Dragon/Transcription disclaimer:  Much of  this encounter note is an electronic transcription/translation of spoken language to printed text. Electronic translation of spoken language may permit erroneous, or at times, nonsensical words or phrases to be inadvertently transcribed. Although I have reviewed the note for such errors, some may still exist.

## 2017-01-13 NOTE — PROGRESS NOTES
Order received for Phase II Cardiac Rehab. Patient does not have a qualifying diagnosis for Phase II Cardiac Rehab. Staff available if additional consult is needed.

## 2017-01-13 NOTE — DISCHARGE INSTRUCTIONS
Instructed not to do any heavy lifting, pushing, pulling, or raising arm above head for next 3-4 weeks.

## 2017-01-20 ENCOUNTER — OFFICE VISIT (OUTPATIENT)
Dept: CARDIOLOGY | Facility: CLINIC | Age: 67
End: 2017-01-20

## 2017-01-20 DIAGNOSIS — I47.1 ATRIAL TACHYCARDIA (HCC): ICD-10-CM

## 2017-01-20 DIAGNOSIS — I47.1 SVT (SUPRAVENTRICULAR TACHYCARDIA) (HCC): Primary | ICD-10-CM

## 2017-01-20 DIAGNOSIS — I48.0 PAROXYSMAL ATRIAL FIBRILLATION (HCC): ICD-10-CM

## 2017-01-20 DIAGNOSIS — I44.1 MOBITZ TYPE II ATRIOVENTRICULAR BLOCK: ICD-10-CM

## 2017-01-20 PROCEDURE — 99024 POSTOP FOLLOW-UP VISIT: CPT | Performed by: INTERNAL MEDICINE

## 2017-01-20 NOTE — PROGRESS NOTES
WOUND CHECK    2017    Angela Tovar, : 1950    WOUND CHECK    B/P: L 136/95(Sitting)  (Standing)     Pulse: 92    Patient has fever: [] Temperature if indicated:98.4     Wound Location:L INFRACLAVICULAR    Dressing Removed [x]        Old Dressing Appearance:  Clean, dry []                 Old, bloody drainage [x]                            Moist, serous drainage []                Moist, thick yellow/green drainage []       Wound Appearance: Redness []                  Drainage []                  Culture obtained []        Color: N/A     Consistency: NONE     Amount: none         Gloves used, wound cleansed with sterile 4x4 and peroxide [x]       MD notified [] MD orders:     Antibiotic started []  If checked, type   Other:     Appointment for follow-up scheduled for 3 months post procedure [x]    Future Appointments  Date Time Provider Department Center   3/27/2017 1:15 PM Jhon Vivas DO [facility] None           ANGE [place], 17      MD Signature:______________________________ Completed By/Date:

## 2017-03-27 ENCOUNTER — OFFICE VISIT (OUTPATIENT)
Dept: CARDIOLOGY | Facility: CLINIC | Age: 67
End: 2017-03-27

## 2017-03-27 VITALS
DIASTOLIC BLOOD PRESSURE: 72 MMHG | HEART RATE: 88 BPM | HEIGHT: 64 IN | BODY MASS INDEX: 30.77 KG/M2 | SYSTOLIC BLOOD PRESSURE: 124 MMHG | WEIGHT: 180.2 LBS

## 2017-03-27 DIAGNOSIS — I47.1 SVT (SUPRAVENTRICULAR TACHYCARDIA) (HCC): Primary | ICD-10-CM

## 2017-03-27 DIAGNOSIS — I44.1 MOBITZ TYPE II ATRIOVENTRICULAR BLOCK: ICD-10-CM

## 2017-03-27 DIAGNOSIS — I48.0 PAROXYSMAL ATRIAL FIBRILLATION (HCC): ICD-10-CM

## 2017-03-27 DIAGNOSIS — I47.1 ATRIAL TACHYCARDIA (HCC): ICD-10-CM

## 2017-03-27 PROCEDURE — 93288 INTERROG EVL PM/LDLS PM IP: CPT | Performed by: INTERNAL MEDICINE

## 2017-03-27 PROCEDURE — 99213 OFFICE O/P EST LOW 20 MIN: CPT | Performed by: PHYSICIAN ASSISTANT

## 2017-03-27 NOTE — PROGRESS NOTES
"Subjective:   Angela Tovar  1950  [phone]      03/27/2017    [facility] CARDI[facility]    Fransico Baez MD  [address] [place] [address] [address] [place] [address]    REFERRING DOCTOR: Ronald Everett MD       Patient ID: Angela Tovar is a 66 y.o. female.    Chief Complaint:   Chief Complaint   Patient presents with   • Pacemaker Check     Problem List:    1. Paroxysmal atrial fibrillation/atrial tachycardia with sick sinus syndrome, bradycardia in the past  a. Onset of atrial fibrillation in 2004.   b. Underwent trial of multiple medications, which were discontinued due to intolerance or unresponsiveness.   c. Electrophysiology study by Dr. Mayen. 01/20/2005, showing evidence of focal epicardial, atrial fibrillation emanating from the common pulmonary veins and probably of 1 of the tributaries. Subsequently, ablation procedure was performed, but was unsuccessful according to the patient. Complication with procedure, resulting in cardiac tamponade. Patient is status post pericardiocentesis.   d. Recurrence of bouts of paroxysmal atrial fibrillation recently with good response to Cardizem.   e. 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 There was transient complete heart block with normalization of conduction at the time of the ablation. Patient currently now in normal sinus rhythm with completely normalization of conduction with KS interval at 130 ms.   f. Event Monitor 11/19 to 12/29 with epiosdes of mobitz II with no prolongation of PP prior to the dizziness spells.   2. Supraventricular tachycardia, rate 190, symptomatic, November 2015:  a. Status post AVNRT, common type ablation on 03/24/2016, with transient complete heart block, but normalized. Prior to this procedure 1 st degree AV block   3. Frequent short episodes of SVT presenting with a \"weak spells.\"   4. Chest pain, negative emergency room evaluation in 2014.   5. Recent diagnosis of " hypertension.   6. Dyslipidemia/hypertriglyceridemia.   7. Family history of heart disease.   8. GERD:   a. Status post Nissen fundoplication x2, with resultant esophageal narrowing.   9. Chronic cough, felt to be related to aspiration.   10. Surgical history:   a. Hysterectomy at the age of 30.   b. Nissen fundoplication x2, 10 years ago and repeated 5 years ago.   c. Right leg lipoma resection x7.     Allergies   Allergen Reactions   • Codeine Nausea And Vomiting   • Dilaudid [Hydromorphone Hcl] Hallucinations   • Percocet [Oxycodone-Acetaminophen] Hallucinations   • Sulfa Antibiotics Rash       Current Outpatient Prescriptions:   •  aspirin 81 MG EC tablet, Take 81 mg by mouth daily., Disp: , Rfl:   •  ibuprofen (ADVIL,MOTRIN) 200 MG tablet, Take 200 mg by mouth Every 6 (Six) Hours As Needed for mild pain (1-3)., Disp: , Rfl:   •  naproxen sodium (ALEVE) 220 MG tablet, Take 440 mg by mouth 2 (Two) Times a Day As Needed for mild pain (1-3)., Disp: , Rfl:   •  omeprazole (priLOSEC) 40 MG capsule, Take 40 mg by mouth Daily. Make take 1 additional tablet if needed, Disp: , Rfl:     History of Present Illness    Patient presents today for followup of her hx of afib, SVT, and AV block s/p DDD PPM. She has been doing well. Has lost 20 lbs since device check after finding out she was diabetic. Has controlled her blood glucose with diet only. Can occasionally feel the PM pace, but no palpitations. No longer having to take daytime naps.     No issues with chest pain, shortness of breath, fevers, chills, night sweats, PND, orthopnea or palpitations. No recent ER visits or hospital stays.      The following portions of the patient's history were reviewed and updated as appropriate: allergies, current medications, past family history, past medical history, past social history, past surgical history and problem list.    ROS   14 point ROS negative except as outlined in problem list, HPI and other parts of the  "note.    Procedures       Objective:       Vitals:    03/27/17 1323   BP: 124/72   BP Location: Left arm   Patient Position: Sitting   Pulse: 88   Weight: 180 lb 3.2 oz (81.7 kg)   Height: 64\" (162.6 cm)       GENERAL: Well-developed, well-nourished patient in no acute distress.  HEENT: Normocephalic, atraumatic, PERRLA. Moist mucous membranes.  NECK: No JVD present at 30°. No carotid bruits auscultated.  LUNGS: Clear to auscultation.  CARDIOVASCULAR: Heart has a regular rate and rhythm. No murmurs, gallops or rubs noted.   ABDOMEN: Soft, nontender. Positive bowel sounds.  MUSCULOSKELETAL: No gross deformities. No clubbing, cyanosis, or lower extremity edema.  SKIN: Pink, warm  Neuro: Nonfocal exam. Gait intact  Ext: No edema or bruising    The patient's old records including ambulatory rhythm recordings (ECGs, Holter/event monitor) were reviewed and discussed.      Lab Review:   Results for orders placed or performed during the hospital encounter of 01/12/17   Hemoglobin A1c   Result Value Ref Range    Hemoglobin A1C 9.90 (H) 4.80 - 5.60 %     Device Interrogation: Normal functioning DDD PPM. V paced 15%. Stable thresholds and impedences. Episodes of SVT.         Diagnosis:   1. SVT (supraventricular tachycardia)    2. Mobitz type II atrioventricular block    3. Paroxysmal atrial fibrillation    4. Atrial tachycardia    Assessment & Plan:     1. Atach/PAF s/p PVA in past with no recurrence.     2. Mobitz Type II AV block s/p DDD PPM with normal device check. Resolution of symptoms of fatigue and lightheadedness     3. SVT s/p AVNRT ablation 3/2016 with brief, asymptomatic runs on device check today.     4. Follow-up in 6 months         ISAIAH Schroeder  03/27/17  1:57 PM      EMR Dragon/Transcription disclaimer:  Much of this encounter note is an electronic transcription/translation of spoken language to printed text. Electronic translation of spoken language may permit erroneous, or at times, nonsensical words " or phrases to be inadvertently transcribed. Although I have reviewed the note for such errors, some may still exist.

## 2017-04-12 ENCOUNTER — TELEPHONE (OUTPATIENT)
Dept: CARDIOLOGY | Facility: CLINIC | Age: 67
End: 2017-04-12

## 2017-04-12 NOTE — TELEPHONE ENCOUNTER
Pt called yesterday afternoon and left a message regarding her feeling more fatigue.  She sent in a remote transmission.  She has had some PACs and is ventricular paced a little more than last visit. Spoke with pt this am and she is feeling good this am.  Told pt if she continues to feel this way to call and send in another reading and I would have Dr Vivas to look at it.  Pt verbalized understanding.

## 2017-06-19 ENCOUNTER — TELEPHONE (OUTPATIENT)
Dept: CARDIOLOGY | Facility: CLINIC | Age: 67
End: 2017-06-19

## 2017-06-19 NOTE — TELEPHONE ENCOUNTER
Spoke with pt regarding sending in manual transmissions.  She said if she feels weird she sends in a reading.  I told her she needs to call us if she sends in a manual reading.  Also explained to her that the monitor reads automatically without her sending in a reading and it looks for abnormal events and it would send automatically.  She wasn't aware of this and she verbalized understanding.

## 2017-07-13 ENCOUNTER — CLINICAL SUPPORT NO REQUIREMENTS (OUTPATIENT)
Dept: CARDIOLOGY | Facility: CLINIC | Age: 67
End: 2017-07-13

## 2017-07-13 DIAGNOSIS — I49.5 SSS (SICK SINUS SYNDROME) (HCC): ICD-10-CM

## 2017-07-13 DIAGNOSIS — I48.0 PAROXYSMAL ATRIAL FIBRILLATION (HCC): Primary | ICD-10-CM

## 2017-07-13 PROCEDURE — 93294 REM INTERROG EVL PM/LDLS PM: CPT | Performed by: INTERNAL MEDICINE

## 2017-07-13 PROCEDURE — 93296 REM INTERROG EVL PM/IDS: CPT | Performed by: INTERNAL MEDICINE

## 2017-10-02 ENCOUNTER — OFFICE VISIT (OUTPATIENT)
Dept: CARDIOLOGY | Facility: CLINIC | Age: 67
End: 2017-10-02

## 2017-10-02 VITALS
HEART RATE: 86 BPM | SYSTOLIC BLOOD PRESSURE: 126 MMHG | BODY MASS INDEX: 28.99 KG/M2 | DIASTOLIC BLOOD PRESSURE: 82 MMHG | HEIGHT: 64 IN | WEIGHT: 169.8 LBS

## 2017-10-02 DIAGNOSIS — I48.0 PAROXYSMAL ATRIAL FIBRILLATION (HCC): Primary | ICD-10-CM

## 2017-10-02 DIAGNOSIS — I47.1 SVT (SUPRAVENTRICULAR TACHYCARDIA) (HCC): ICD-10-CM

## 2017-10-02 DIAGNOSIS — I44.1 MOBITZ TYPE II ATRIOVENTRICULAR BLOCK: ICD-10-CM

## 2017-10-02 DIAGNOSIS — Z95.0 PACEMAKER: ICD-10-CM

## 2017-10-02 PROCEDURE — 93280 PM DEVICE PROGR EVAL DUAL: CPT | Performed by: INTERNAL MEDICINE

## 2017-10-02 PROCEDURE — 99213 OFFICE O/P EST LOW 20 MIN: CPT | Performed by: INTERNAL MEDICINE

## 2017-10-02 NOTE — PROGRESS NOTES
"Subjective:   Angela Tovar  1950  [phone]      10/02/2017    [facility] CARDIOLOGY    Fransico Baez MD  [address] [place] [address] [address] [place] [address]    REFERRING DOCTOR: Ronald Everett      Patient ID: Angela Tovar is a 67 y.o. female.    Chief Complaint: Afib,SVT,PPM    Problem List:     1. Paroxysmal atrial fibrillation/atrial tachycardia with sick sinus syndrome, bradycardia in the past  a. Onset of atrial fibrillation in 2004.   b. Underwent trial of multiple medications, which were discontinued due to intolerance or unresponsiveness.   c. Electrophysiology study by Dr. Mayen. 01/20/2005, showing evidence of focal epicardial, atrial fibrillation emanating from the common pulmonary veins and probably of 1 of the tributaries. Subsequently, ablation procedure was performed, but was unsuccessful according to the patient. Complication with procedure, resulting in cardiac tamponade. Patient is status post pericardiocentesis.   d. Recurrence of bouts of paroxysmal atrial fibrillation recently with good response to Cardizem.   e. 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 There was transient complete heart block with normalization of conduction at the time of the ablation. Patient currently now in normal sinus rhythm with completely normalization of conduction with CT interval at 130 ms.   f. Event Monitor 11/19 to 12/29 with epiosdes of mobitz II with no prolongation of PP prior to the dizziness spells.   2. Supraventricular tachycardia, rate 190, symptomatic, November 2015:  a. Status post AVNRT, common type ablation on 03/24/2016, with transient complete heart block, but normalized. Prior to this procedure 1 st degree AV block   3. Frequent short episodes of SVT presenting with a \"weak spells.\"   4. Chest pain, negative emergency room evaluation in 2014.   5. Recent diagnosis of hypertension.   6. Dyslipidemia/hypertriglyceridemia. "   7. Family history of heart disease.   8. GERD:   a. Status post Nissen fundoplication x2, with resultant esophageal narrowing.   9. Chronic cough, felt to be related to aspiration.   10. Surgical history:   a. Hysterectomy at the age of 30.   b. Nissen fundoplication x2, 10 years ago and repeated 5 years ago.   c. Right leg lipoma resection x7.     Allergies   Allergen Reactions   • Codeine Nausea And Vomiting   • Dilaudid [Hydromorphone Hcl] Hallucinations   • Percocet [Oxycodone-Acetaminophen] Hallucinations   • Sulfa Antibiotics Rash       Current Outpatient Prescriptions:   •  aspirin 81 MG EC tablet, Take 81 mg by mouth daily., Disp: , Rfl:   •  ibuprofen (ADVIL,MOTRIN) 200 MG tablet, Take 200 mg by mouth Every 6 (Six) Hours As Needed for mild pain (1-3)., Disp: , Rfl:   •  naproxen sodium (ALEVE) 220 MG tablet, Take 440 mg by mouth 2 (Two) Times a Day As Needed for mild pain (1-3)., Disp: , Rfl:   •  omeprazole (priLOSEC) 40 MG capsule, Take 40 mg by mouth Daily. Make take 1 additional tablet if needed, Disp: , Rfl:     History of Present Illness  Patient is a 67-year-old female with a history of paroxysmal atrial fibrillation status post ablation in the past that outside hospital 2005 but no documentation that I have, SVT status post AVNRT ablation and second-degree AV block now status post permanent pacemaker here for follow-up visit. Overall doing ok. Some occ skipped beats. Feeling better after the PPM implant. Lost 30 lbs since Feb. Diet control for increased glucose that was found in the past. Cough is better as well.     No issues with chest pain, shortness of breath, fevers, chills, night sweats, PND, orthopnea or palpitations. No recent ER visits or hospital stays.      The following portions of the patient's history were reviewed and updated as appropriate: allergies, current medications, past family history, past medical history, past social history, past surgical history and problem list.    ROS  "  14 point ROS negative except as outlined in problem list, HPI and other parts of the note.    Procedures       Objective:       Vitals:    10/02/17 1310   BP: 126/82   BP Location: Right arm   Patient Position: Sitting   Pulse: 86   Weight: 169 lb 12.8 oz (77 kg)   Height: 64\" (162.6 cm)       GENERAL: Well-developed, well-nourished patient in no acute distress.  HEENT: Normocephalic, atraumatic, PERRLA. Moist mucous membranes.  NECK: No JVD present at 30°. No carotid bruits auscultated.  LUNGS: Clear to auscultation.  CARDIOVASCULAR: Heart has a regular rate and rhythm. No murmurs, gallops or rubs noted.   ABDOMEN: Soft, nontender. Positive bowel sounds.  MUSCULOSKELETAL: No gross deformities. No clubbing, cyanosis, or lower extremity edema.  SKIN: Pink, warm  Neuro: Nonfocal exam. Gait intact  Ext: No edema or bruising    The patient's old records including ambulatory rhythm recordings (ECGs, Holter/event monitor) were reviewed and discussed.      Lab Review:   Results for orders placed or performed during the hospital encounter of 01/12/17   Hemoglobin A1c   Result Value Ref Range    Hemoglobin A1C 9.90 (H) 4.80 - 5.60 %     [employer] Device Check  As   RA paced 1% right ventricular paced 44%.  P waves 4.2 mV R waves 16.7 mV.  RA and RV lead threshold impedances within normal limits.  No events or changes made.  9+ years left on the battery.  PAC 18.7K,         Diagnosis:   1. Paroxysmal atrial fibrillation  2. SVT (supraventricular tachycardia)  3. Mobitz type II atrioventricular block  4. Pacemaker      Assessment & Plan:   1.  SVT status post AVNRT ablation in the past.  No recurrences.  2.  Symptomatic Mobitz type II status post dual-chamber pacemaker.  Normal device check today with no events or changes made.    3.  Presumptive paroxysmal atrial fibrillation status post ablation in 2005 at an outside hospital. PAC and PVCs noted.  I do not have any true documentation of any atrial " fibrillation.  No atrial fibrillation noted on device check.  For now will only continue aspirin even per the patient's preference as well.  If any recurrence of atrial fibrillation that at that time would add a novel oral anticoagulant plus or minus even antiarrhythmic drug therapy/AV blaise agents.  4. Follow up in 6 mths         CC: Ronald Vivas DO  10/02/17  1:14 PM      EMR Dragon/Transcription disclaimer:  Much of this encounter note is an electronic transcription/translation of spoken language to printed text. Electronic translation of spoken language may permit erroneous, or at times, nonsensical words or phrases to be inadvertently transcribed. Although I have reviewed the note for such errors, some may still exist.

## 2018-01-25 ENCOUNTER — CLINICAL SUPPORT NO REQUIREMENTS (OUTPATIENT)
Dept: CARDIOLOGY | Facility: CLINIC | Age: 68
End: 2018-01-25

## 2018-01-25 DIAGNOSIS — I48.0 PAROXYSMAL ATRIAL FIBRILLATION (HCC): ICD-10-CM

## 2018-01-25 PROCEDURE — 93294 REM INTERROG EVL PM/LDLS PM: CPT | Performed by: INTERNAL MEDICINE

## 2018-01-25 PROCEDURE — 93296 REM INTERROG EVL PM/IDS: CPT | Performed by: INTERNAL MEDICINE

## 2018-05-14 ENCOUNTER — OFFICE VISIT (OUTPATIENT)
Dept: CARDIOLOGY | Facility: CLINIC | Age: 68
End: 2018-05-14

## 2018-05-14 VITALS
DIASTOLIC BLOOD PRESSURE: 82 MMHG | HEIGHT: 65 IN | WEIGHT: 174.8 LBS | HEART RATE: 82 BPM | SYSTOLIC BLOOD PRESSURE: 130 MMHG | BODY MASS INDEX: 29.12 KG/M2

## 2018-05-14 DIAGNOSIS — I44.1 MOBITZ TYPE II ATRIOVENTRICULAR BLOCK: ICD-10-CM

## 2018-05-14 DIAGNOSIS — Z95.0 PACEMAKER: ICD-10-CM

## 2018-05-14 DIAGNOSIS — I47.1 ATRIAL TACHYCARDIA (HCC): ICD-10-CM

## 2018-05-14 DIAGNOSIS — I48.0 PAROXYSMAL ATRIAL FIBRILLATION (HCC): ICD-10-CM

## 2018-05-14 DIAGNOSIS — I47.1 SVT (SUPRAVENTRICULAR TACHYCARDIA) (HCC): Primary | ICD-10-CM

## 2018-05-14 PROCEDURE — 93280 PM DEVICE PROGR EVAL DUAL: CPT | Performed by: INTERNAL MEDICINE

## 2018-05-14 PROCEDURE — 99213 OFFICE O/P EST LOW 20 MIN: CPT | Performed by: INTERNAL MEDICINE

## 2018-05-14 NOTE — PROGRESS NOTES
"Subjective:   Angela Tovar  1950  [phone]      05/14/2018    [facility] CARDIOLOGY    Fransico Baez MD  [address] [place] [address] [address] [place] [id number]    REFERRING DOCTOR: Arturo, NIKI, PPM      Patient ID: Angela Tovar is a 67 y.o. female.    Chief Complaint:   Chief Complaint   Patient presents with   • Atrial Fibrillation   • Rapid Heart Rate     Problem List:     1. Paroxysmal atrial fibrillation/atrial tachycardia with sick sinus syndrome, bradycardia in the past  a. Onset of atrial fibrillation in 2004.   b. Underwent trial of multiple medications, which were discontinued due to intolerance or unresponsiveness.   c. Electrophysiology study by Dr. Mayen. 01/20/2005, showing evidence of focal epicardial, atrial fibrillation emanating from the common pulmonary veins and probably of 1 of the tributaries. Subsequently, ablation procedure was performed, but was unsuccessful according to the patient. Complication with procedure, resulting in cardiac tamponade. Patient is status post pericardiocentesis.   d. Recurrence of bouts of paroxysmal atrial fibrillation recently with good response to Cardizem.   e. 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 There was transient complete heart block with normalization of conduction at the time of the ablation. Patient currently now in normal sinus rhythm with completely normalization of conduction with W[place] interval at 130 ms.   f. Event Monitor 11/19 to 12/29 with epiosdes of mobitz II with no prolongation of PP prior to the dizziness spells.   2. Supraventricular tachycardia, rate 190, symptomatic, November 2015:  a. Status post AVNRT, common type ablation on 03/24/2016, with transient complete heart block, but normalized. Prior to this procedure 1 st degree AV block   3. Frequent short episodes of SVT presenting with a \"weak spells.\"   4. Chest pain, negative emergency room evaluation in 2014. "   5. Recent diagnosis of hypertension.   6. Dyslipidemia/hypertriglyceridemia.   7. Family history of heart disease.   8. GERD:   a. Status post Nissen fundoplication x2, with resultant esophageal narrowing.   9. Chronic cough, felt to be related to aspiration.   10. Surgical history:   a. Hysterectomy at the age of 30.   b. Nissen fundoplication x2, 10 years ago and repeated 5 years ago.   c. Right leg lipoma resection x7.     Allergies   Allergen Reactions   • Codeine Nausea And Vomiting   • Dilaudid [Hydromorphone Hcl] Hallucinations   • Percocet [Oxycodone-Acetaminophen] Hallucinations   • Sulfa Antibiotics Rash       Current Outpatient Prescriptions:   •  aspirin 81 MG EC tablet, Take 81 mg by mouth daily., Disp: , Rfl:   •  ibuprofen (ADVIL,MOTRIN) 200 MG tablet, Take 200 mg by mouth Every 6 (Six) Hours As Needed for mild pain (1-3)., Disp: , Rfl:   •  omeprazole (priLOSEC) 40 MG capsule, Take 40 mg by mouth Daily. Make take 1 additional tablet if needed, Disp: , Rfl:     History of Present Illness  Patient is a 67-year-old female with a history of paroxysmal atrial fibrillation status post ablation in the past that outside hospital 2005 but no documentation that I have, SVT status post AVNRT ablation and second-degree AV block now status post permanent pacemaker here for follow-up visit. Overall doing ok. Some occ skipped beats. Feeling better after the PPM implant. Diet control for increased glucose that was found in the past. Got weak at [place] but thought to be sec to dehydration and not eating.       No issues with chest pain, shortness of breath, fevers, chills, night sweats, PND, orthopnea. No recent ER visits or hospital stays.      The following portions of the patient's history were reviewed and updated as appropriate: allergies, current medications, past family history, past medical history, past social history, past surgical history and problem list.    ROS   14 point ROS negative except as  "outlined in problem list, HPI and other parts of the note.    Procedures       Objective:       Vitals:    05/14/18 1010   BP: 130/82   BP Location: Right arm   Patient Position: Sitting   Pulse: 82   Weight: 79.3 kg (174 lb 12.8 oz)   Height: 163.8 cm (64.5\")       GENERAL: Well-developed, well-nourished patient in no acute distress.  HEENT: Normocephalic, atraumatic, PERRLA. Moist mucous membranes.  NECK: No JVD present at 30°. No carotid bruits auscultated.  LUNGS: Clear to auscultation.  CARDIOVASCULAR: Heart has a regular rate and rhythm. No murmurs, gallops or rubs noted.   ABDOMEN: Soft, nontender. Positive bowel sounds.  MUSCULOSKELETAL: No gross deformities. No clubbing, cyanosis, or lower extremity edema.  SKIN: Pink, warm  Neuro: Nonfocal exam. Gait intact  Ext: No edema or bruising  PPM site ok     The patient's old records including ambulatory rhythm recordings (ECGs, Holter/event monitor) were reviewed and discussed.      Lab Review:   Results for orders placed or performed during the hospital encounter of 01/12/17   Hemoglobin A1c   Result Value Ref Range    Hemoglobin A1C 9.90 (H) 4.80 - 5.60 %     [employer] PPM  Normal Device check and less than 1% AMS  RA 2%, RV paced 40%  PVCs noted 1.6K        Diagnosis:   1. SVT (supraventricular tachycardia)  2. Paroxysmal atrial fibrillation  3. Mobitz type II atrioventricular block  4. Atrial tachycardia  5. Pacemaker      Assessment & Plan:   1.  SVT status post AVNRT ablation in the past.  No recurrences.  2.  Symptomatic Mobitz type II status post dual-chamber pacemaker.  Normal device check today with no events or changes made.    3.  Presumptive paroxysmal atrial fibrillation status post ablation in 2005 at an outside hospital. PAC and PVCs noted.  I do not have any true documentation of any atrial fibrillation.  No atrial fibrillation noted on device check.  For now will only continue aspirin even per the patient's preference as well.  If any " recurrence of atrial fibrillation that at that time would add a novel oral anticoagulant plus or minus even antiarrhythmic drug therapy/AV blaise agents. Minimal to no symptoms  4. Follow up in 6 mths         CC: Robert Vivas,   05/14/18  10:25 AM      EMR Dragon/Transcription disclaimer:  Much of this encounter note is an electronic transcription/translation of spoken language to printed text. Electronic translation of spoken language may permit erroneous, or at times, nonsensical words or phrases to be inadvertently transcribed. Although I have reviewed the note for such errors, some may still exist.

## 2018-08-08 ENCOUNTER — CLINICAL SUPPORT NO REQUIREMENTS (OUTPATIENT)
Dept: CARDIOLOGY | Facility: CLINIC | Age: 68
End: 2018-08-08

## 2018-08-08 DIAGNOSIS — I44.1 MOBITZ TYPE II ATRIOVENTRICULAR BLOCK: ICD-10-CM

## 2018-08-08 DIAGNOSIS — I48.0 PAROXYSMAL ATRIAL FIBRILLATION (HCC): ICD-10-CM

## 2018-08-08 PROCEDURE — 93296 REM INTERROG EVL PM/IDS: CPT | Performed by: INTERNAL MEDICINE

## 2018-08-08 PROCEDURE — 93294 REM INTERROG EVL PM/LDLS PM: CPT | Performed by: INTERNAL MEDICINE

## 2018-09-14 ENCOUNTER — LAB REQUISITION (OUTPATIENT)
Dept: LAB | Facility: HOSPITAL | Age: 68
End: 2018-09-14

## 2018-09-14 DIAGNOSIS — R13.10 DYSPHAGIA: ICD-10-CM

## 2018-09-14 PROCEDURE — 88305 TISSUE EXAM BY PATHOLOGIST: CPT | Performed by: INTERNAL MEDICINE

## 2018-09-17 LAB
CYTO UR: NORMAL
LAB AP CASE REPORT: NORMAL
LAB AP CLINICAL INFORMATION: NORMAL
PATH REPORT.FINAL DX SPEC: NORMAL
PATH REPORT.GROSS SPEC: NORMAL

## 2018-11-16 ENCOUNTER — PATIENT MESSAGE (OUTPATIENT)
Dept: CARDIOLOGY | Facility: CLINIC | Age: 68
End: 2018-11-16

## 2019-01-07 ENCOUNTER — OFFICE VISIT (OUTPATIENT)
Dept: CARDIOLOGY | Facility: CLINIC | Age: 69
End: 2019-01-07

## 2019-01-07 VITALS
SYSTOLIC BLOOD PRESSURE: 141 MMHG | DIASTOLIC BLOOD PRESSURE: 84 MMHG | HEART RATE: 91 BPM | WEIGHT: 181.6 LBS | BODY MASS INDEX: 31 KG/M2 | HEIGHT: 64 IN

## 2019-01-07 DIAGNOSIS — Z95.0 PACEMAKER: ICD-10-CM

## 2019-01-07 DIAGNOSIS — I47.1 SVT (SUPRAVENTRICULAR TACHYCARDIA) (HCC): ICD-10-CM

## 2019-01-07 DIAGNOSIS — I44.1 MOBITZ TYPE II ATRIOVENTRICULAR BLOCK: ICD-10-CM

## 2019-01-07 PROCEDURE — 93280 PM DEVICE PROGR EVAL DUAL: CPT | Performed by: NURSE PRACTITIONER

## 2019-01-07 PROCEDURE — 99213 OFFICE O/P EST LOW 20 MIN: CPT | Performed by: NURSE PRACTITIONER

## 2019-01-07 NOTE — PROGRESS NOTES
"Subjective:   Angela Tovar  1950  [phone]      05/14/2018    [facility] CARDIOLOGY    Sasha, Fransico WILSON MD  [address] [place] [address] [address] [place] [address]    REFERRING DOCTOR: NIKI Morris, PPM      Patient ID: Angela Tovar is a 68 y.o. female.    Chief Complaint:   Chief Complaint   Patient presents with   • Rapid Heart Rate     Problem List:     1. Paroxysmal atrial fibrillation/atrial tachycardia with sick sinus syndrome, bradycardia in the past  a. Onset of atrial fibrillation in 2004.   b. Underwent trial of multiple medications, which were discontinued due to intolerance or unresponsiveness.   c. Electrophysiology study by Dr. Mayen. 01/20/2005, showing evidence of focal epicardial, atrial fibrillation emanating from the common pulmonary veins and probably of 1 of the tributaries. Subsequently, ablation procedure was performed, but was unsuccessful according to the patient. Complication with procedure, resulting in cardiac tamponade. Patient is status post pericardiocentesis.   d. Recurrence of bouts of paroxysmal atrial fibrillation recently with good response to Cardizem.   e. 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 There was transient complete heart block with normalization of conduction at the time of the ablation. Patient currently now in normal sinus rhythm with completely normalization of conduction with IA interval at 130 ms.   f. Event Monitor 11/19 to 12/29 with epiosdes of mobitz II with no prolongation of PP prior to the dizziness spells.   2. Supraventricular tachycardia, rate 190, symptomatic, November 2015:  a. Status post AVNRT, common type ablation on 03/24/2016, with transient complete heart block, but normalized. Prior to this procedure 1 st degree AV block   3. Frequent short episodes of SVT presenting with a \"weak spells.\"   4. Chest pain, negative emergency room evaluation in 2014.   5. Recent diagnosis of " hypertension.   6. Dyslipidemia/hypertriglyceridemia.   7. Family history of heart disease.   8. GERD:   a. Status post Nissen fundoplication x2, with resultant esophageal narrowing.   9. Chronic cough, felt to be related to aspiration.   10. Surgical history:   a. Hysterectomy at the age of 30.   b. Nissen fundoplication x2, 10 years ago and repeated 5 years ago.   c. Right leg lipoma resection x7.     Allergies   Allergen Reactions   • Codeine Nausea And Vomiting   • Dilaudid [Hydromorphone Hcl] Hallucinations   • Percocet [Oxycodone-Acetaminophen] Hallucinations   • Sulfa Antibiotics Rash       Current Outpatient Medications:   •  aspirin 81 MG EC tablet, Take 81 mg by mouth daily., Disp: , Rfl:   •  ibuprofen (ADVIL,MOTRIN) 200 MG tablet, Take 200 mg by mouth Every 6 (Six) Hours As Needed for mild pain (1-3)., Disp: , Rfl:   •  omeprazole (priLOSEC) 40 MG capsule, Take 40 mg by mouth Daily. Make take 1 additional tablet if needed, Disp: , Rfl:     History of Present Illness  Ms. Tovar is a 68-year-old female with a history of paroxysmal atrial fibrillation status post ablation in the past that outside hospital 2005 but no documentation that I have, SVT status post AVNRT ablation and second-degree AV block now status post permanent pacemaker. She presents for follow up today. She has been doing well. Rare occ. Short lived palpitations. No other issues. No issues with chest pain, shortness of breath, fevers, chills, night sweats, PND, orthopnea. No recent ER visits or hospital stays.    The following portions of the patient's history were reviewed and updated as appropriate: allergies, current medications, past family history, past medical history, past social history, past surgical history and problem list.    ROS   14 point ROS negative except as outlined in problem list, HPI and other parts of the note.    Procedures       Objective:       Vitals:    01/07/19 0925   BP: 141/84   BP Location: Right arm  "  Patient Position: Sitting   Pulse: 91   Weight: 82.4 kg (181 lb 9.6 oz)   Height: 162.6 cm (64\")       GENERAL: Well-developed, well-nourished patient in no acute distress.  HEENT: Normocephalic, atraumatic, PERRLA. Moist mucous membranes.  NECK: No JVD present at 30°. No carotid bruits auscultated.  LUNGS: Clear to auscultation.  Non labored.   CARDIOVASCULAR: Heart has a regular rate and rhythm. No murmurs, gallops or rubs noted.   ABDOMEN: Soft, nontender. Positive bowel sounds.  MUSCULOSKELETAL: No gross deformities. No clubbing, cyanosis, or lower extremity edema.  SKIN: Pink, warm  Neuro: Nonfocal exam. Gait intact  Ext: No edema or bruising  PPM site ok     The patient's old records including ambulatory rhythm recordings (ECGs, Holter/event monitor) were reviewed and discussed.      Lab Review:   Results for orders placed or performed in visit on 09/14/18   Tissue Pathology Exam   Result Value Ref Range    Case Report       Surgical Pathology Report                         Case: [id number]                                  Authorizing Provider:  Theo Valverde     Collected:           09/14/2018 09:36 AM                                 MD Pepe                                                                    Pathologist:           Mart Martin MD         Received:            09/14/2018 09:36 AM          Specimen:    Esophagus, Distal                                                                          Clinical Information       The working history is dysphagia; prior esophageal dilatation; 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       Final Diagnosis       DISTAL ESOPHAGEAL BIOPSY:  Reflux esophagitis; no intestinal metaplasia or dysplasia identified.     DGD/mbc       Gross Description       Received in formalin labeled distal esophagus biopsy is a 0.8x0.3x0.2 cm aggregate of pink tissue fragments submitted entirely in block 1A. LED/mbc       Microscopic " Description       Sections of the GE junction show acanthosis of the squamous epithelium with hyperplasia of the basal regenerative layer and mild chronic inflammatory infiltrate trafficking in the intraepithelial spaces. The contiguous gastric mucosa shows foveolar epithelial hyperplasia with mild chronic inflammation in the lamina propria. No intestinal goblet cell metaplasia or dysplasia is seen.            Device check 1/7/18: RA 3%, RV 33%, normal threshold and impedance, 8 years left on battery, <1% Afib longest 1 min, 8 seconds.   Diagnosis:   1. SVT (supraventricular tachycardia)  2. Paroxysmal atrial fibrillation  3. Mobitz type II atrioventricular block  4. Atrial tachycardia  5. Pacemaker  Assessment & Plan:   1.  SVT status post AVNRT ablation in the past.  No recurrences.  2.  Symptomatic Mobitz type II status post dual-chamber pacemaker with normal function on device check today.   3.  Presumptive paroxysmal atrial fibrillation status post ablation in 2005 at an outside hospital. <1% AMS, longest 1 min, 8 seconds. On ASA only. No on NOAC or AAD for now. If longer more frequent episodes consider NOAC plus/minus AAD.  4.  Follow up in 8 mths       CC: Robert Felix    Electronically signed by MARYJANE Galeano, 01/07/19, 10:10 AM.    I, Jhon Vivas, have reviewed the note in full and agree with all aspects of the above including physical exam, assessment, labs and plan with changes made accordingly. Face to Face Time was spent with the patient.    Jhon Vivas,   01/07/19  10:29 AM

## 2019-02-21 ENCOUNTER — CLINICAL SUPPORT NO REQUIREMENTS (OUTPATIENT)
Dept: CARDIOLOGY | Facility: CLINIC | Age: 69
End: 2019-02-21

## 2019-02-21 DIAGNOSIS — I44.1 MOBITZ TYPE II ATRIOVENTRICULAR BLOCK: ICD-10-CM

## 2019-02-21 DIAGNOSIS — I48.0 PAROXYSMAL ATRIAL FIBRILLATION (HCC): ICD-10-CM

## 2019-02-21 PROCEDURE — 93294 REM INTERROG EVL PM/LDLS PM: CPT | Performed by: INTERNAL MEDICINE

## 2019-02-21 PROCEDURE — 93296 REM INTERROG EVL PM/IDS: CPT | Performed by: INTERNAL MEDICINE

## 2019-03-22 ENCOUNTER — OUTSIDE FACILITY SERVICE (OUTPATIENT)
Dept: GASTROENTEROLOGY | Facility: CLINIC | Age: 69
End: 2019-03-22

## 2019-03-22 PROCEDURE — 43248 EGD GUIDE WIRE INSERTION: CPT | Performed by: INTERNAL MEDICINE

## 2019-03-22 PROCEDURE — 43239 EGD BIOPSY SINGLE/MULTIPLE: CPT | Performed by: INTERNAL MEDICINE

## 2019-04-23 ENCOUNTER — CLINICAL SUPPORT NO REQUIREMENTS (OUTPATIENT)
Dept: CARDIOLOGY | Facility: CLINIC | Age: 69
End: 2019-04-23

## 2019-04-23 DIAGNOSIS — I48.0 PAROXYSMAL ATRIAL FIBRILLATION (HCC): Primary | ICD-10-CM

## 2019-05-23 ENCOUNTER — TELEPHONE (OUTPATIENT)
Dept: CARDIOLOGY | Facility: CLINIC | Age: 69
End: 2019-05-23

## 2019-05-23 ENCOUNTER — CLINICAL SUPPORT NO REQUIREMENTS (OUTPATIENT)
Dept: CARDIOLOGY | Facility: CLINIC | Age: 69
End: 2019-05-23

## 2019-05-23 DIAGNOSIS — I48.0 PAROXYSMAL ATRIAL FIBRILLATION (HCC): Primary | ICD-10-CM

## 2019-05-23 DIAGNOSIS — I49.5 SSS (SICK SINUS SYNDROME) (HCC): ICD-10-CM

## 2019-05-23 PROCEDURE — 93294 REM INTERROG EVL PM/LDLS PM: CPT | Performed by: INTERNAL MEDICINE

## 2019-05-23 PROCEDURE — 93296 REM INTERROG EVL PM/IDS: CPT | Performed by: INTERNAL MEDICINE

## 2019-06-06 ENCOUNTER — CLINICAL SUPPORT NO REQUIREMENTS (OUTPATIENT)
Dept: CARDIOLOGY | Facility: CLINIC | Age: 69
End: 2019-06-06

## 2019-06-06 DIAGNOSIS — I48.0 PAROXYSMAL ATRIAL FIBRILLATION (HCC): Primary | ICD-10-CM

## 2019-06-10 ENCOUNTER — TELEPHONE (OUTPATIENT)
Dept: CARDIOLOGY | Facility: CLINIC | Age: 69
End: 2019-06-10

## 2019-06-10 NOTE — TELEPHONE ENCOUNTER
Notified research    ----- Message from Terry Maldonado MD sent at 6/9/2019  1:45 PM EDT -----  Monitor [place],  Let research know and see if she can get in the Eliquis trial    ----- Message -----  From: Interface, Scans Incoming  Sent: 6/6/2019   8:23 AM  To: Terry Maldonado MD

## 2019-06-27 ENCOUNTER — TELEPHONE (OUTPATIENT)
Dept: CARDIOLOGY | Facility: CLINIC | Age: 69
End: 2019-06-27

## 2019-06-27 ENCOUNTER — CLINICAL SUPPORT NO REQUIREMENTS (OUTPATIENT)
Dept: CARDIOLOGY | Facility: CLINIC | Age: 69
End: 2019-06-27

## 2019-06-27 DIAGNOSIS — I48.0 PAROXYSMAL ATRIAL FIBRILLATION (HCC): Primary | ICD-10-CM

## 2019-06-27 NOTE — TELEPHONE ENCOUNTER
Terry Maldonado MD Tackett, Shannon M, RN             Low burden, Will address at next office visit

## 2019-09-10 ENCOUNTER — OFFICE VISIT (OUTPATIENT)
Dept: CARDIOLOGY | Facility: CLINIC | Age: 69
End: 2019-09-10

## 2019-09-10 VITALS
OXYGEN SATURATION: 96 % | WEIGHT: 183.2 LBS | BODY MASS INDEX: 30.52 KG/M2 | SYSTOLIC BLOOD PRESSURE: 130 MMHG | HEART RATE: 80 BPM | DIASTOLIC BLOOD PRESSURE: 82 MMHG | HEIGHT: 65 IN

## 2019-09-10 DIAGNOSIS — I48.0 PAROXYSMAL ATRIAL FIBRILLATION (HCC): Primary | ICD-10-CM

## 2019-09-10 PROCEDURE — 93280 PM DEVICE PROGR EVAL DUAL: CPT | Performed by: PHYSICIAN ASSISTANT

## 2019-09-10 PROCEDURE — 99214 OFFICE O/P EST MOD 30 MIN: CPT | Performed by: PHYSICIAN ASSISTANT

## 2019-09-10 NOTE — PROGRESS NOTES
Angela Tovar  1950  PCP: Fransico Baez MD    SUBJECTIVE:   Angela Tovar is a 69 y.o. female seen for a follow up visit regarding the following:     Chief Complaint: Follow up for afib     HPI:    Ms. Tovar is a 68-year-old female with a history of paroxysmal atrial fibrillation status post ablation in the past that outside hospital 2005 but no documentation that I have, SVT status post AVNRT ablation and second-degree AV block now status post permanent pacemaker. She presents for follow up today. SHe has been doing well, but has noticed an increase in palpitations. Her device interrogation revealed longer episodes of afib lasting around 3 hours. She is overall not too bothered by these episodes. No CP, sob, edema, orthopnea.     Cardiac PMH: (Old records have been reviewed and summarized below)    Problem List:     1. Paroxysmal atrial fibrillation/atrial tachycardia with sick sinus syndrome, bradycardia in the past  a. Onset of atrial fibrillation in 2004.   b. Underwent trial of multiple medications, which were discontinued due to intolerance or unresponsiveness.   c. Electrophysiology study by Dr. Mayen. 01/20/2005, showing evidence of focal epicardial, atrial fibrillation emanating from the common pulmonary veins and probably of 1 of the tributaries. Subsequently, ablation procedure was performed, but was unsuccessful according to the patient. Complication with procedure, resulting in cardiac tamponade. Patient is status post pericardiocentesis.   d. Recurrence of bouts of paroxysmal atrial fibrillation recently with good response to Cardizem.   e. 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 There was transient complete heart block with normalization of conduction at the time of the ablation. Patient currently now in normal sinus rhythm with completely normalization of conduction with RI interval at 130 ms.   f. Event Monitor 11/19 to 12/29 with epiosdes of  "mobitz II with no prolongation of PP prior to the dizziness spells.   2. Supraventricular tachycardia, rate 190, symptomatic, November 2015:  a. Status post AVNRT, common type ablation on 03/24/2016, with transient complete heart block, but normalized. Prior to this procedure 1 st degree AV block   3. Frequent short episodes of SVT presenting with a \"weak spells.\"   4. Chest pain, negative emergency room evaluation in 2014.   5. Recent diagnosis of hypertension.   6. Dyslipidemia/hypertriglyceridemia.   7. Family history of heart disease.   8. GERD:   a. Status post Nissen fundoplication x2, with resultant esophageal narrowing.   9. Chronic cough, felt to be related to aspiration.   10. Surgical history:   a. Hysterectomy at the age of 30.   b. Nissen fundoplication x2, 10 years ago and repeated 5 years ago.   c. Right leg lipoma resection x7.       Current Outpatient Medications:   •  ibuprofen (ADVIL,MOTRIN) 200 MG tablet, Take 200 mg by mouth Every 6 (Six) Hours As Needed for mild pain (1-3)., Disp: , Rfl:   •  omeprazole (priLOSEC) 40 MG capsule, Take 40 mg by mouth Daily. Make take 1 additional tablet if needed, Disp: , Rfl:   •  polyethylene glycol (GoLYTELY) 236 g solution, Starting at noon on day prior to procedure, drink 8 ounces every 30 minutes until all gone or stools are clear. May add flavor packet., Disp: 4000 mL, Rfl: 0  •  apixaban (ELIQUIS) 5 MG tablet tablet, Take 1 tablet by mouth Every 12 (Twelve) Hours., Disp: 60 tablet, Rfl: 6    Past Medical History, Past Surgical History, Family history, Social History, and Medications were all reviewed with the patient today and updated as necessary.       Patient Active Problem List   Diagnosis   • Paroxysmal atrial fibrillation (CMS/HCC)   • Atrial tachycardia (CMS/HCC)   • SVT (supraventricular tachycardia) (CMS/HCC)   • Chest pain   • Hypertension   • Dyslipidemia   • Hypertriglyceridemia   • Chronic cough   • Snoring   • Mobitz type II atrioventricular " block   • Pacemaker   • Pacemaker     Allergies   Allergen Reactions   • Codeine Nausea And Vomiting   • Dilaudid [Hydromorphone Hcl] Hallucinations   • Percocet [Oxycodone-Acetaminophen] Hallucinations   • Sulfa Antibiotics Rash     Past Medical History:   Diagnosis Date   • Arthritis    • Atrial tachycardia (CMS/HCC) 9/20/2016   • Back pain    • Chest pain 9/20/2016   • Chronic cough    • Dyslipidemia 9/20/2016   • GERD (gastroesophageal reflux disease)     Status post Nissen fundoplication x2, with resultant esophageal narrowing.    • Head ache    • Hypertension 9/20/2016   • Hypertriglyceridemia 9/20/2016   • Migraine    • Mobitz type II atrioventricular block    • Osteopenia    • Paroxysmal atrial fibrillation (CMS/HCC) 9/20/2016   • PONV (postoperative nausea and vomiting)    • Snoring 9/20/2016   • Supraventricular tachycardia (CMS/HCC) 9/20/2016   • Wears eyeglasses      Past Surgical History:   Procedure Laterality Date   • ABLATION OF DYSRHYTHMIC FOCUS  3/2016    1st ablation 2005 I think.   • CARDIAC ABLATION      X 2   • CARDIAC CATHETERIZATION  a few years   • CARDIAC ELECTROPHYSIOLOGY PROCEDURE N/A 1/12/2017    Procedure: Pacemaker DC new;  Surgeon: Jhon Vivas DO;  Location: [facility] INVASIVE LOCATION;  Service:    • COLONOSCOPY      3 years ago   • D&C HYSTEROSCOPY     • HYSTERECTOMY      Hysterectomy at the age of 30.    • INSERT / REPLACE / REMOVE PACEMAKER  1/12/2017   • LIPOMA RESECTION Right     Right leg lipoma resection x7.    • NISSEN FUNDOPLICATION       Nissen fundoplication x2, with resultant esophageal narrowing.     • OTHER SURGICAL HISTORY      Electrophysiology study by Dr. Mayen. 01/20/2005, showing evidence of focal epicardial, atrial fibrillation emanating from the common pulmonary veins and probably of 1 of the tributaries. Subsequently, ablation procedure was performed, but was unsuccessful according to the patient.  Complication with procedure, resulting in cardiac  "tamponade.  Patient is status post pericardiocentesis. Sta    • PERICARDIOCENTESIS       Family History   Problem Relation Age of Onset   • Heart attack Mother      Social History     Tobacco Use   • Smoking status: Never Smoker   • Smokeless tobacco: Never Used   Substance Use Topics   • Alcohol use: No         PHYSICAL EXAM:    /82 (BP Location: Right arm, Patient Position: Sitting)   Pulse 80   Ht 163.8 cm (64.5\")   Wt 83.1 kg (183 lb 3.2 oz)   SpO2 96%   BMI 30.96 kg/m²        Wt Readings from Last 5 Encounters:   09/10/19 83.1 kg (183 lb 3.2 oz)   01/07/19 82.4 kg (181 lb 9.6 oz)   05/14/18 79.3 kg (174 lb 12.8 oz)   10/02/17 77 kg (169 lb 12.8 oz)   03/27/17 81.7 kg (180 lb 3.2 oz)       BP Readings from Last 5 Encounters:   09/10/19 130/82   01/07/19 141/84   05/14/18 130/82   10/02/17 126/82   03/27/17 124/72       General-Well Nourished, Well developed  Eyes - PERRLA  Neck- supple, No mass  CV- regular rate and rhythm, no MRG, No edema  Lung- clear bilaterally  Abd- soft, +BS  Musc/skel - Norm strength and range of motion  Skin- warm and dry  Neuro - Alert & Oriented x 3, appropriate mood.        Medical problems and test results were reviewed with the patient today.     No results found for this or any previous visit (from the past 672 hour(s)).      EKG: (EKG has been independently visualized by me and summarized below)    Procedures     ASSESSMENT and PLAN    1.  SVT status post AVNRT ablation in the past.  No recurrences.  2.  Symptomatic Mobitz type II status post dual-chamber pacemaker with normal function on device check today.   3.  Presumptive paroxysmal atrial fibrillation status post ablation in 2005 at an outside hospital. 1% AMS, but longest was around 3 hours. Previously on ASA. Will start Eliquis 5mg BID today. Patient provided with samples and copay card. Consider starting AAD if increased burden, patient would like to defer addition of any other medications.   4.  Follow up in 6 " mths       Return in about 6 months (around 3/10/2020).        Paola Yen PA-C   Cardiology/Electrophysiology  9/10/2019  11:47 AM

## 2019-09-13 ENCOUNTER — TELEPHONE (OUTPATIENT)
Dept: CARDIOLOGY | Facility: CLINIC | Age: 69
End: 2019-09-13

## 2019-09-13 NOTE — TELEPHONE ENCOUNTER
Pt called asking me to check her home monitor for atrial fibrillation. She only had a few seconds today and none yesterday.She verbalized satisfaction and stated she would call again if she started feeling bad.

## 2019-10-17 ENCOUNTER — CLINICAL SUPPORT NO REQUIREMENTS (OUTPATIENT)
Dept: CARDIOLOGY | Facility: CLINIC | Age: 69
End: 2019-10-17

## 2019-10-17 DIAGNOSIS — I47.1 SVT (SUPRAVENTRICULAR TACHYCARDIA) (HCC): Primary | ICD-10-CM

## 2019-10-17 DIAGNOSIS — I48.0 PAROXYSMAL ATRIAL FIBRILLATION (HCC): ICD-10-CM

## 2019-10-17 DIAGNOSIS — I44.1 MOBITZ TYPE II ATRIOVENTRICULAR BLOCK: ICD-10-CM

## 2019-10-17 PROCEDURE — 93294 REM INTERROG EVL PM/LDLS PM: CPT | Performed by: INTERNAL MEDICINE

## 2019-10-17 PROCEDURE — 93296 REM INTERROG EVL PM/IDS: CPT | Performed by: INTERNAL MEDICINE

## 2020-01-16 ENCOUNTER — TELEPHONE (OUTPATIENT)
Dept: CARDIOLOGY | Facility: CLINIC | Age: 70
End: 2020-01-16

## 2020-01-20 NOTE — TELEPHONE ENCOUNTER
Mrs Tovar called to say she is out of town and will not be home until after February 1st. She will send a manual transmission at that time.

## 2020-01-30 ENCOUNTER — CLINICAL SUPPORT NO REQUIREMENTS (OUTPATIENT)
Dept: CARDIOLOGY | Facility: CLINIC | Age: 70
End: 2020-01-30

## 2020-01-30 DIAGNOSIS — I47.1 SVT (SUPRAVENTRICULAR TACHYCARDIA) (HCC): ICD-10-CM

## 2020-01-30 DIAGNOSIS — I48.0 PAROXYSMAL ATRIAL FIBRILLATION (HCC): ICD-10-CM

## 2020-01-30 PROCEDURE — 93294 REM INTERROG EVL PM/LDLS PM: CPT | Performed by: INTERNAL MEDICINE

## 2020-01-30 PROCEDURE — 93296 REM INTERROG EVL PM/IDS: CPT | Performed by: INTERNAL MEDICINE

## 2020-04-15 ENCOUNTER — TELEPHONE (OUTPATIENT)
Dept: CARDIOLOGY | Facility: CLINIC | Age: 70
End: 2020-04-15

## 2020-04-15 NOTE — TELEPHONE ENCOUNTER
----- Message from Angela Tovar sent at 4/14/2020 11:21 AM EDT -----  Regarding: Non-Urgent Medical Question  Contact: [phone]  Nabeel Guevara. I hope you are doing well. I don't see where to send this to a nurse or .  I noticed you are out with return today 4/14. I received an appointment reminder for 4/21 at 3:15pm. I'm not sure you're office is open to non-emergency appointments. I prefer my appointment be moved to later date when we are more secure being around others. What is next date for pacemaker remote? Seems it's been a while. I've been having a metallic taste in roof of mouth ... could that be related to Eliquis?  It's not everyday. Thank you.  Angela Tovar  [birth year]

## 2020-04-15 NOTE — TELEPHONE ENCOUNTER
I contacted patient and she is interested in a telehealth visit for her upcoming appt if possible or moving the appt back to a later date due to the pandemic. She also states that she is doing well and not currently having any issues so it is up to us to decide what works best with scheduling. She will need a remote transmission scheduled prior to telehealth if that is what is decided.    She is going to continue to monitor the metallic taste in her mouth as it is not too bad at this point.

## 2020-04-23 ENCOUNTER — DOCUMENTATION (OUTPATIENT)
Dept: CARDIOLOGY | Facility: CLINIC | Age: 70
End: 2020-04-23

## 2020-04-23 NOTE — PROGRESS NOTES
Patient complaining of metallic taste in mouth that began after starting Eliquis. Will change over to Xarelto 20mg daily. Rx sent.     Paola Yen PA-C

## 2020-04-29 ENCOUNTER — OFFICE VISIT (OUTPATIENT)
Dept: CARDIOLOGY | Facility: CLINIC | Age: 70
End: 2020-04-29

## 2020-04-29 VITALS
WEIGHT: 176 LBS | HEART RATE: 94 BPM | HEIGHT: 64 IN | BODY MASS INDEX: 30.05 KG/M2 | SYSTOLIC BLOOD PRESSURE: 142 MMHG | DIASTOLIC BLOOD PRESSURE: 91 MMHG

## 2020-04-29 DIAGNOSIS — I44.1 MOBITZ TYPE II ATRIOVENTRICULAR BLOCK: ICD-10-CM

## 2020-04-29 DIAGNOSIS — I48.0 PAROXYSMAL ATRIAL FIBRILLATION (HCC): ICD-10-CM

## 2020-04-29 DIAGNOSIS — I47.1 SVT (SUPRAVENTRICULAR TACHYCARDIA) (HCC): Primary | ICD-10-CM

## 2020-04-29 PROCEDURE — 99442 PR PHYS/QHP TELEPHONE EVALUATION 11-20 MIN: CPT | Performed by: INTERNAL MEDICINE

## 2020-04-29 NOTE — PROGRESS NOTES
Angela Tovar  1950  PCP: Fransico Baez MD    SUBJECTIVE:   Angela Tovar is a 69 y.o. female seen for a follow up visit regarding the following:     Chief Complaint: Follow up for afib     HPI:        History:  Ms. Tovar is a 68-year-old female with a history of paroxysmal atrial fibrillation status post ablation in the past that outside hospital 2005 but no documentation that I have, SVT status post AVNRT ablation and second-degree AV block now status post permanent pacemaker. She presents for follow up today. SHe has been doing well, but has noticed an increase in palpitations. Her device interrogation revealed longer episodes of afib lasting around 3 hours. She is overall not too bothered by these episodes. No CP, sob, edema, orthopnea.     Cardiac PMH: (Old records have been reviewed and summarized below)    Problem List:     1. Paroxysmal atrial fibrillation/atrial tachycardia with sick sinus syndrome, bradycardia in the past  a. Onset of atrial fibrillation in 2004.   b. Underwent trial of multiple medications, which were discontinued due to intolerance or unresponsiveness.   c. Electrophysiology study by Dr. Mayen. 01/20/2005, showing evidence of focal epicardial, atrial fibrillation emanating from the common pulmonary veins and probably of 1 of the tributaries. Subsequently, ablation procedure was performed, but was unsuccessful according to the patient. Complication with procedure, resulting in cardiac tamponade. Patient is status post pericardiocentesis.   d. Recurrence of bouts of paroxysmal atrial fibrillation recently with good response to Cardizem.   e. 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 There was transient complete heart block with normalization of conduction at the time of the ablation. Patient currently now in normal sinus rhythm with completely normalization of conduction with DC interval at 130 ms.   f. Event Monitor 11/19 to  "12/29 with epiosdes of mobitz II with no prolongation of PP prior to the dizziness spells.   2. Supraventricular tachycardia, rate 190, symptomatic, November 2015:  a. Status post AVNRT, common type ablation on 03/24/2016, with transient complete heart block, but normalized. Prior to this procedure 1 st degree AV block   3. Frequent short episodes of SVT presenting with a \"weak spells.\"   4. Chest pain, negative emergency room evaluation in 2014.   5. Recent diagnosis of hypertension.   6. Dyslipidemia/hypertriglyceridemia.   7. Family history of heart disease.   8. GERD:   a. Status post Nissen fundoplication x2, with resultant esophageal narrowing.   9. Chronic cough, felt to be related to aspiration.   10. Surgical history:   a. Hysterectomy at the age of 30.   b. Nissen fundoplication x2, 10 years ago and repeated 5 years ago.   c. Right leg lipoma resection x7.       Current Outpatient Medications:   •  ibuprofen (ADVIL,MOTRIN) 200 MG tablet, Take 200 mg by mouth Every 6 (Six) Hours As Needed for mild pain (1-3)., Disp: , Rfl:   •  NON FORMULARY, \"medication similar to CBD\", Disp: , Rfl:   •  omeprazole (priLOSEC) 40 MG capsule, Take 40 mg by mouth Daily. Make take 1 additional tablet if needed, Disp: , Rfl:   •  rivaroxaban (Xarelto) 20 MG tablet, Take 1 tablet by mouth Daily., Disp: 30 tablet, Rfl: 4  •  TURMERIC PO, Take  by mouth Daily., Disp: , Rfl:     Past Medical History, Past Surgical History, Family history, Social History, and Medications were all reviewed with the patient today and updated as necessary.       Patient Active Problem List   Diagnosis   • Paroxysmal atrial fibrillation (CMS/HCC)   • Atrial tachycardia (CMS/HCC)   • SVT (supraventricular tachycardia) (CMS/HCC)   • Chest pain   • Hypertension   • Dyslipidemia   • Hypertriglyceridemia   • Chronic cough   • Snoring   • Mobitz type II atrioventricular block   • Pacemaker   • Pacemaker     Allergies   Allergen Reactions   • Codeine Nausea " And Vomiting   • Dilaudid [Hydromorphone Hcl] Hallucinations   • Erythromycin Other (See Comments)   • Oxycodone-Acetaminophen Other (See Comments)   • Percocet [Oxycodone-Acetaminophen] Hallucinations   • Sulfa Antibiotics Rash     Past Medical History:   Diagnosis Date   • Arthritis    • Atrial tachycardia (CMS/HCC) 9/20/2016   • Back pain    • Chest pain 9/20/2016   • Chronic cough    • Dyslipidemia 9/20/2016   • GERD (gastroesophageal reflux disease)     Status post Nissen fundoplication x2, with resultant esophageal narrowing.    • Head ache    • Hypertension 9/20/2016   • Hypertriglyceridemia 9/20/2016   • Migraine    • Mobitz type II atrioventricular block    • Osteopenia    • Paroxysmal atrial fibrillation (CMS/HCC) 9/20/2016   • PONV (postoperative nausea and vomiting)    • Snoring 9/20/2016   • Supraventricular tachycardia (CMS/HCC) 9/20/2016   • Wears eyeglasses      Past Surgical History:   Procedure Laterality Date   • ABLATION OF DYSRHYTHMIC FOCUS  3/2016    1st ablation 2005 I think.   • CARDIAC ABLATION      X 2   • CARDIAC CATHETERIZATION  a few years   • CARDIAC ELECTROPHYSIOLOGY PROCEDURE N/A 1/12/2017    Procedure: Pacemaker DC new;  Surgeon: Jhon Vivas DO;  Location: [facility] INVASIVE LOCATION;  Service:    • COLONOSCOPY      3 years ago   • D&C HYSTEROSCOPY     • HYSTERECTOMY      Hysterectomy at the age of 30.    • INSERT / REPLACE / REMOVE PACEMAKER  1/12/2017   • LIPOMA RESECTION Right     Right leg lipoma resection x7.    • NISSEN FUNDOPLICATION       Nissen fundoplication x2, with resultant esophageal narrowing.     • OTHER SURGICAL HISTORY      Electrophysiology study by Dr. Mayen. 01/20/2005, showing evidence of focal epicardial, atrial fibrillation emanating from the common pulmonary veins and probably of 1 of the tributaries. Subsequently, ablation procedure was performed, but was unsuccessful according to the patient.  Complication with procedure, resulting in cardiac  "tamponade.  Patient is status post pericardiocentesis. Sta    • PERICARDIOCENTESIS       Family History   Problem Relation Age of Onset   • Heart attack Mother      Social History     Tobacco Use   • Smoking status: Never Smoker   • Smokeless tobacco: Never Used   Substance Use Topics   • Alcohol use: No         PHYSICAL EXAM:    /91 (BP Location: Left arm, Patient Position: Sitting)   Pulse 94   Ht 162.6 cm (64\")   Wt 79.8 kg (176 lb)   BMI 30.21 kg/m²        Wt Readings from Last 5 Encounters:   04/29/20 79.8 kg (176 lb)   09/10/19 83.1 kg (183 lb 3.2 oz)   01/07/19 82.4 kg (181 lb 9.6 oz)   05/14/18 79.3 kg (174 lb 12.8 oz)   10/02/17 77 kg (169 lb 12.8 oz)       BP Readings from Last 5 Encounters:   04/29/20 142/91   09/10/19 130/82   01/07/19 141/84   05/14/18 130/82   10/02/17 126/82       Neuro - Alert & Oriented x 3, appropriate mood.        Medical problems and test results were reviewed with the patient today.     No results found for this or any previous visit (from the past 672 hour(s)).      EKG: (EKG has been independently visualized by me and summarized below)    Procedures     ASSESSMENT and PLAN    1.  SVT status post AVNRT ablation in the past.  Had a complication from the procedure that resulted AV block  2.  Symptomatic Mobitz type II status post dual-chamber pacemaker.   3.  Paroxysmal atrial fibrillation status post ablation in 2005 at an outside hospital. 1% AMS Previously on ASA. Started on Eliquis 5mg BID, but changed to Xarelto secondary to possible side effects  4.  Pacemaker -  [employer] - Stable function     This patient has consented to a telehealth visit via phone. The visit was scheduled as a follow up visit to comply with patient safety concerns in accordance with CDC recommendations.  All vitals recorded within this visit are reported by the patient.  I spent 15 minutes in total including but not limited to the 8:50 minutes spent in direct conversation with this patient. "       Return in about 6 months (around 10/29/2020) for office visit and device check with [facility].      Terry Maldonado M.D., [email or web address], F.H.R.S.  Cardiology/Electrophysiology  4/29/2020  13:05

## 2020-06-22 ENCOUNTER — TELEPHONE (OUTPATIENT)
Dept: CARDIOLOGY | Facility: CLINIC | Age: 70
End: 2020-06-22

## 2020-06-22 NOTE — TELEPHONE ENCOUNTER
Mrs Tovar called to let us know she is having her land line unhooked. I ordered a cell adaptor for her and told her to call us once she received it and we will make sure it is working.

## 2020-07-17 ENCOUNTER — TELEPHONE (OUTPATIENT)
Dept: CARDIOLOGY | Facility: CLINIC | Age: 70
End: 2020-07-17

## 2020-07-17 RX ORDER — ESOMEPRAZOLE MAGNESIUM 40 MG/1
40 CAPSULE, DELAYED RELEASE ORAL 2 TIMES DAILY
Qty: 60 CAPSULE | Refills: 2 | Status: SHIPPED | OUTPATIENT
Start: 2020-07-17 | End: 2020-08-19

## 2020-07-17 NOTE — TELEPHONE ENCOUNTER
Patient is requesting clearance to have a colonoscopy on 8/19/20. How long should she hold Eliquis prior to the procedure?                Dr. Barker  [phone]    *Also fax the last device check as well*

## 2020-08-12 RX ORDER — SODIUM, POTASSIUM,MAG SULFATES 17.5-3.13G
2 SOLUTION, RECONSTITUTED, ORAL ORAL TAKE AS DIRECTED
Qty: 354 ML | Refills: 0 | Status: SHIPPED | OUTPATIENT
Start: 2020-08-12 | End: 2020-09-29

## 2020-08-16 ENCOUNTER — APPOINTMENT (OUTPATIENT)
Dept: PREADMISSION TESTING | Facility: HOSPITAL | Age: 70
End: 2020-08-16

## 2020-08-16 PROCEDURE — U0002 COVID-19 LAB TEST NON-CDC: HCPCS

## 2020-08-16 PROCEDURE — C9803 HOPD COVID-19 SPEC COLLECT: HCPCS

## 2020-08-16 PROCEDURE — U0004 COV-19 TEST NON-CDC HGH THRU: HCPCS

## 2020-08-17 LAB
REF LAB TEST METHOD: NORMAL
SARS-COV-2 RNA RESP QL NAA+PROBE: NOT DETECTED

## 2020-08-19 ENCOUNTER — OUTSIDE FACILITY SERVICE (OUTPATIENT)
Dept: GASTROENTEROLOGY | Facility: CLINIC | Age: 70
End: 2020-08-19

## 2020-08-19 PROCEDURE — 43239 EGD BIOPSY SINGLE/MULTIPLE: CPT | Performed by: INTERNAL MEDICINE

## 2020-08-19 PROCEDURE — 45385 COLONOSCOPY W/LESION REMOVAL: CPT | Performed by: INTERNAL MEDICINE

## 2020-08-19 PROCEDURE — 43249 ESOPH EGD DILATION <30 MM: CPT | Performed by: INTERNAL MEDICINE

## 2020-08-19 RX ORDER — ESOMEPRAZOLE MAGNESIUM 40 MG/1
40 CAPSULE, DELAYED RELEASE ORAL 2 TIMES DAILY
Qty: 180 CAPSULE | Refills: 3 | Status: SHIPPED | OUTPATIENT
Start: 2020-08-19 | End: 2020-12-10 | Stop reason: SDUPTHER

## 2020-09-29 ENCOUNTER — OFFICE VISIT (OUTPATIENT)
Dept: CARDIOLOGY | Facility: CLINIC | Age: 70
End: 2020-09-29

## 2020-09-29 VITALS
HEIGHT: 64 IN | WEIGHT: 182 LBS | SYSTOLIC BLOOD PRESSURE: 134 MMHG | BODY MASS INDEX: 31.07 KG/M2 | TEMPERATURE: 97.8 F | OXYGEN SATURATION: 97 % | DIASTOLIC BLOOD PRESSURE: 74 MMHG | HEART RATE: 95 BPM

## 2020-09-29 DIAGNOSIS — I48.0 PAROXYSMAL ATRIAL FIBRILLATION (HCC): Primary | ICD-10-CM

## 2020-09-29 DIAGNOSIS — I44.1 MOBITZ TYPE II ATRIOVENTRICULAR BLOCK: ICD-10-CM

## 2020-09-29 DIAGNOSIS — Z95.0 PACEMAKER: ICD-10-CM

## 2020-09-29 PROCEDURE — 93280 PM DEVICE PROGR EVAL DUAL: CPT | Performed by: PHYSICIAN ASSISTANT

## 2020-09-29 PROCEDURE — 99213 OFFICE O/P EST LOW 20 MIN: CPT | Performed by: PHYSICIAN ASSISTANT

## 2020-09-29 NOTE — PROGRESS NOTES
Angela Tovar  1950  PCP: Edwina Sibley MD    SUBJECTIVE:   Angela Tovar is a 70 y.o. female seen for a follow up visit regarding the following:     Chief Complaint: Follow up for afib, bradycardia     HPI:    Ms. Tovar is a 68-year-old female with a history of paroxysmal atrial fibrillation status post ablation in the past that outside hospital 2005 but no documentation that I have, SVT status post AVNRT ablation and second-degree AV block now status post permanent pacemaker. She presents for follow up today. At her last visit she was started on Eliquis for runs of PAF. She has tolerated this well. She is still having some brief afib, but she is asymptomatic. She denies any exertional chest pain, shannon, or edema.      Cardiac PMH: (Old records have been reviewed and summarized below)     Problem List:     1. Paroxysmal atrial fibrillation/atrial tachycardia with sick sinus syndrome, bradycardia in the past  a. Onset of atrial fibrillation in 2004.   b. Underwent trial of multiple medications, which were discontinued due to intolerance or unresponsiveness.   c. Electrophysiology study by Dr. Mayen. 01/20/2005, showing evidence of focal epicardial, atrial fibrillation emanating from the common pulmonary veins and probably of 1 of the tributaries. Subsequently, ablation procedure was performed, but was unsuccessful according to the patient. Complication with procedure, resulting in cardiac tamponade. Patient is status post pericardiocentesis.   d. Recurrence of bouts of paroxysmal atrial fibrillation recently with good response to Cardizem.   e. 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 There was transient complete heart block with normalization of conduction at the time of the ablation. Patient currently now in normal sinus rhythm with completely normalization of conduction with MN interval at 130 ms.   f. Event Monitor 11/19 to 12/29 with epiosdes of  "mobitz II with no prolongation of PP prior to the dizziness spells.   2. Supraventricular tachycardia, rate 190, symptomatic, November 2015:  a. Status post AVNRT, common type ablation on 03/24/2016, with transient complete heart block, but normalized. Prior to this procedure 1 st degree AV block   3. Frequent short episodes of SVT presenting with a \"weak spells.\"   4. Chest pain, negative emergency room evaluation in 2014.   5. Recent diagnosis of hypertension.   6. Dyslipidemia/hypertriglyceridemia.   7. Family history of heart disease.   8. GERD:   a. Status post Nissen fundoplication x2, with resultant esophageal narrowing.   9. Chronic cough, felt to be related to aspiration.   10. Surgical history:   a. Hysterectomy at the age of 30.   b. Nissen fundoplication x2, 10 years ago and repeated 5 years ago.   c. Right leg lipoma resection x7.          Current Outpatient Medications:   •  apixaban (ELIQUIS) 5 MG tablet tablet, Take 5 mg by mouth 2 (Two) Times a Day., Disp: , Rfl:   •  esomeprazole (nexIUM) 40 MG capsule, Take 1 capsule by mouth 2 (Two) Times a Day. (Patient taking differently: Take 40 mg by mouth Daily.), Disp: 180 capsule, Rfl: 3  •  ibuprofen (ADVIL,MOTRIN) 200 MG tablet, Take 200 mg by mouth Every 6 (Six) Hours As Needed for mild pain (1-3)., Disp: , Rfl:   •  NON FORMULARY, \"medication similar to CBD\", Disp: , Rfl:   •  TURMERIC PO, Take  by mouth Daily., Disp: , Rfl:     Past Medical History, Past Surgical History, Family history, Social History, and Medications were all reviewed with the patient today and updated as necessary.       Patient Active Problem List   Diagnosis   • Paroxysmal atrial fibrillation (CMS/HCC)   • Atrial tachycardia (CMS/HCC)   • SVT (supraventricular tachycardia) (CMS/HCC)   • Chest pain   • Hypertension   • Dyslipidemia   • Hypertriglyceridemia   • Chronic cough   • Snoring   • Mobitz type II atrioventricular block   • Pacemaker   • Pacemaker     Allergies   Allergen " Reactions   • Codeine Nausea And Vomiting   • Dilaudid [Hydromorphone Hcl] Hallucinations   • Erythromycin Other (See Comments)   • Oxycodone-Acetaminophen Other (See Comments)   • Percocet [Oxycodone-Acetaminophen] Hallucinations   • Sulfa Antibiotics Rash     Past Medical History:   Diagnosis Date   • Arthritis    • Atrial tachycardia (CMS/HCC) 9/20/2016   • Back pain    • Chest pain 9/20/2016   • Chronic cough    • Dyslipidemia 9/20/2016   • GERD (gastroesophageal reflux disease)     Status post Nissen fundoplication x2, with resultant esophageal narrowing.    • Head ache    • Hypertension 9/20/2016   • Hypertriglyceridemia 9/20/2016   • Migraine    • Mobitz type II atrioventricular block    • Osteopenia    • Paroxysmal atrial fibrillation (CMS/HCC) 9/20/2016   • PONV (postoperative nausea and vomiting)    • Snoring 9/20/2016   • Supraventricular tachycardia (CMS/HCC) 9/20/2016   • Wears eyeglasses      Past Surgical History:   Procedure Laterality Date   • ABLATION OF DYSRHYTHMIC FOCUS  3/2016    1st ablation 2005 I think.   • CARDIAC ABLATION      X 2   • CARDIAC CATHETERIZATION  a few years   • CARDIAC ELECTROPHYSIOLOGY PROCEDURE N/A 1/12/2017    Procedure: Pacemaker DC new;  Surgeon: Jhon Vivas DO;  Location: [facility] INVASIVE LOCATION;  Service:    • COLONOSCOPY      3 years ago   • D&C HYSTEROSCOPY     • HYSTERECTOMY      Hysterectomy at the age of 30.    • INSERT / REPLACE / REMOVE PACEMAKER  1/12/2017   • LIPOMA RESECTION Right     Right leg lipoma resection x7.    • NISSEN FUNDOPLICATION       Nissen fundoplication x2, with resultant esophageal narrowing.     • OTHER SURGICAL HISTORY      Electrophysiology study by Dr. Mayen. 01/20/2005, showing evidence of focal epicardial, atrial fibrillation emanating from the common pulmonary veins and probably of 1 of the tributaries. Subsequently, ablation procedure was performed, but was unsuccessful according to the patient.  Complication with  "procedure, resulting in cardiac tamponade.  Patient is status post pericardiocentesis. Sta    • PERICARDIOCENTESIS       Family History   Problem Relation Age of Onset   • Heart attack Mother      Social History     Tobacco Use   • Smoking status: Never Smoker   • Smokeless tobacco: Never Used   Substance Use Topics   • Alcohol use: No         PHYSICAL EXAM:    /74 (BP Location: Left arm, Patient Position: Sitting, Cuff Size: Adult)   Pulse 95   Temp 97.8 °F (36.6 °C)   Ht 162.6 cm (64\")   Wt 82.6 kg (182 lb)   SpO2 97%   BMI 31.24 kg/m²        Wt Readings from Last 5 Encounters:   09/29/20 82.6 kg (182 lb)   04/29/20 79.8 kg (176 lb)   09/10/19 83.1 kg (183 lb 3.2 oz)   01/07/19 82.4 kg (181 lb 9.6 oz)   05/14/18 79.3 kg (174 lb 12.8 oz)       BP Readings from Last 5 Encounters:   09/29/20 134/74   04/29/20 142/91   09/10/19 130/82   01/07/19 141/84   05/14/18 130/82       General-Well Nourished, Well developed  Eyes - PERRLA  Neck- supple, No mass  CV- regular rate and rhythm, no MRG, No edema  Lung- clear bilaterally  Abd- soft, +BS  Musc/skel - Norm strength and range of motion  Skin- warm and dry  Neuro - Alert & Oriented x 3, appropriate mood.        Medical problems and test results were reviewed with the patient today.     No results found for this or any previous visit (from the past 672 hour(s)).      EKG: (EKG has been independently visualized by me and summarized below)    Procedures     ASSESSMENT and PLAN    1.  SVT status post AVNRT ablation in the past.  No recurrences.  2.  Symptomatic Mobitz type II status post dual-chamber pacemaker with normal function on device check today. 6 years on battery. A paced 2% V paced 35%. <1% mode switching.   3.  Presumptive paroxysmal atrial fibrillation status post ablation in 2005 at an outside hospital. 1% AMS, with longest around 3 hours. She is now anticoagulated with Eliquis 5mg BID. Consider starting AAD if increased burden, patient would like to " defer addition of any other medications. She is asymptomatic and rates controlled.   4. She would prefer to follow-up in one year vs. 6 months. She is on home monitoring.     Return in about 1 year (around 9/29/2021) for BSC .        Paola Yen PA-C   Cardiology/Electrophysiology  9/29/2020  16:09 EDT

## 2020-12-10 RX ORDER — ESOMEPRAZOLE MAGNESIUM 40 MG/1
40 CAPSULE, DELAYED RELEASE ORAL 2 TIMES DAILY
Qty: 180 CAPSULE | Refills: 1 | Status: SHIPPED | OUTPATIENT
Start: 2020-12-10 | End: 2021-09-10 | Stop reason: SDUPTHER

## 2020-12-29 RX ORDER — APIXABAN 5 MG/1
TABLET, FILM COATED ORAL
Qty: 180 TABLET | Refills: 3 | Status: SHIPPED | OUTPATIENT
Start: 2020-12-29 | End: 2021-10-05 | Stop reason: SDUPTHER

## 2021-01-04 ENCOUNTER — TELEPHONE (OUTPATIENT)
Dept: CARDIOLOGY | Facility: CLINIC | Age: 71
End: 2021-01-04

## 2021-01-04 NOTE — TELEPHONE ENCOUNTER
Patient is scheduled for eyelid surgery with Dr Kevin Serrano on 1/27/21. How long should she hold her Eliquis. They recommended 14 days.

## 2021-01-19 ENCOUNTER — TELEPHONE (OUTPATIENT)
Dept: CARDIOLOGY | Facility: CLINIC | Age: 71
End: 2021-01-19

## 2021-01-19 NOTE — TELEPHONE ENCOUNTER
Called Mrs Tovar in regards to her Latitude home monitor not reading.  Left message for a return call.

## 2021-01-25 ENCOUNTER — TELEPHONE (OUTPATIENT)
Dept: CARDIOLOGY | Facility: CLINIC | Age: 71
End: 2021-01-25

## 2021-01-25 NOTE — TELEPHONE ENCOUNTER
Mrs Tovar called returning my call for her home monitor.  She has disconnected her home phone and needs a cell adapter.  Sending cell adapter and she will call when she receives it.

## 2021-03-20 PROCEDURE — 93294 REM INTERROG EVL PM/LDLS PM: CPT | Performed by: INTERNAL MEDICINE

## 2021-03-20 PROCEDURE — 93296 REM INTERROG EVL PM/IDS: CPT | Performed by: INTERNAL MEDICINE

## 2021-09-10 RX ORDER — ESOMEPRAZOLE MAGNESIUM 40 MG/1
40 CAPSULE, DELAYED RELEASE ORAL 2 TIMES DAILY
Qty: 180 CAPSULE | Refills: 0 | Status: SHIPPED | OUTPATIENT
Start: 2021-09-10 | End: 2022-07-25

## 2021-09-10 NOTE — TELEPHONE ENCOUNTER
Rx Refill Note  Requested Prescriptions      No prescriptions requested or ordered in this encounter      Last office visit with prescribing clinician: EGD and Colon 08/19/2020  Next office visit with prescribing clinician: Visit date not found            Butch Ayala MA  09/10/21, 15:57 EDT

## 2021-09-16 NOTE — PROGRESS NOTES
OFFICE VISIT  NOTE  [facility] CARDIOLOGY      Name: Angela Tovar    Date: 2021  MRN:  [id number]  :  1950      REFERRING/PRIMARY PROVIDER:  Edwina Sibley MD    Chief Complaint   Patient presents with   • Atrial Fibrillation       HPI: Angela Tovar is a 71 y.o. female who presents today for new consultation for chest pain.  Chest pain described as substernal, pressure, with exertion such as going up hills in her neighborhood or up steps in her home ongoing for the past 10 months.  Also reports shortness of breath with exertion.  Had Lexiscan nuclear stress test 2021 read by Dr. Jiménez at [facility], she had nausea, chest pain or shortness of breath with infusion, but scintigraphic images were negative for ischemia or infarct.  She also has a history of PAF status post ablation by Dr. Albarran and pacemaker followed by EP.    Past Medical History:   Diagnosis Date   • Arthritis    • Atrial tachycardia (CMS/HCC) 2016   • Back pain    • Chest pain 2016   • Chronic cough    • Dyslipidemia 2016   • GERD (gastroesophageal reflux disease)     Status post Nissen fundoplication x2, with resultant esophageal narrowing.    • Head ache    • Hypertension 2016   • Hypertriglyceridemia 2016   • Migraine    • Mobitz type II atrioventricular block    • Osteopenia    • Paroxysmal atrial fibrillation (CMS/HCC) 2016   • PONV (postoperative nausea and vomiting)    • Snoring 2016   • Supraventricular tachycardia (CMS/HCC) 2016   • Wears eyeglasses        Past Surgical History:   Procedure Laterality Date   • ABLATION OF DYSRHYTHMIC FOCUS  3/2016    1st ablation  I think.   • CARDIAC ABLATION      X 2   • CARDIAC CATHETERIZATION  a few years   • CARDIAC ELECTROPHYSIOLOGY PROCEDURE N/A 2017    Procedure: Pacemaker DC new;  Surgeon: Jhon Vivas DO;  Location: [facility] EP INVASIVE LOCATION;  Service:    • COLONOSCOPY      3 years ago   • D & C  HYSTEROSCOPY     • HYSTERECTOMY      Hysterectomy at the age of 30.    • INSERT / REPLACE / REMOVE PACEMAKER  1/12/2017   • LIPOMA RESECTION Right     Right leg lipoma resection x7.    • NISSEN FUNDOPLICATION       Nissen fundoplication x2, with resultant esophageal narrowing.     • OTHER SURGICAL HISTORY      Electrophysiology study by Dr. Mayen. 01/20/2005, showing evidence of focal epicardial, atrial fibrillation emanating from the common pulmonary veins and probably of 1 of the tributaries. Subsequently, ablation procedure was performed, but was unsuccessful according to the patient.  Complication with procedure, resulting in cardiac tamponade.  Patient is status post pericardiocentesis. Sta    • PERICARDIOCENTESIS         Social History     Socioeconomic History   • Marital status:      Spouse name: Not on file   • Number of children: Not on file   • Years of education: Not on file   • Highest education level: Not on file   Tobacco Use   • Smoking status: Never Smoker   • Smokeless tobacco: Never Used   Substance and Sexual Activity   • Alcohol use: No   • Drug use: No   • Sexual activity: Defer       Family History   Problem Relation Age of Onset   • Heart attack Mother         ROS:   Constitutional no fever,  no weight loss   Skin no rash, no subcutaneous nodules   Otolaryngeal no difficulty swallowing   Cardiovascular See HPI   Pulmonary no cough, no sputum production   Gastrointestinal no constipation, no diarrhea   Genitourinary no dysuria, no hematuria   Hematologic no easy bruisability, no abnormal bleeding   Musculoskeletal no muscle pain   Neurologic no dizziness, no falls         Allergies   Allergen Reactions   • Codeine Nausea And Vomiting   • Dilaudid [Hydromorphone Hcl] Hallucinations   • Erythromycin Other (See Comments)   • Oxycodone-Acetaminophen Other (See Comments)   • Percocet [Oxycodone-Acetaminophen] Hallucinations   • Sulfa Antibiotics Rash         Current Outpatient  "Medications:   •  Ascorbic Acid (VITAMIN C PO), Take 1 tablet by mouth Daily., Disp: , Rfl:   •  CALCIUM PO, Take 500 mg by mouth Daily., Disp: , Rfl:   •  Cholecalciferol (VITAMIN D3 PO), Take 1 tablet by mouth Daily., Disp: , Rfl:   •  Cyanocobalamin (VITAMIN B-12 PO), Take 1 tablet by mouth Daily., Disp: , Rfl:   •  Eliquis 5 MG tablet tablet, TAKE 1 TABLET BY MOUTH EVERY 12 (TWELVE) HOURS., Disp: 180 tablet, Rfl: 3  •  esomeprazole (nexIUM) 40 MG capsule, Take 1 capsule by mouth 2 (Two) Times a Day. Patient needs a appointment prior to next refill., Disp: 180 capsule, Rfl: 0  •  ibuprofen (ADVIL,MOTRIN) 200 MG tablet, Take 200 mg by mouth Every 6 (Six) Hours As Needed for mild pain (1-3)., Disp: , Rfl:   •  Jardiance 25 MG tablet tablet, Take 25 mg by mouth Daily., Disp: , Rfl:   •  Probiotic Product (PROBIOTIC PO), Take 1 tablet by mouth Daily., Disp: , Rfl:   •  TURMERIC PO, Take  by mouth Daily., Disp: , Rfl:   •  NON FORMULARY, \"medication similar to CBD\", Disp: , Rfl:     Vitals:    09/20/21 1307   BP: 116/78   BP Location: Right arm   Patient Position: Sitting   Pulse: 81   SpO2: 96%   Weight: 80.3 kg (177 lb)   Height: 163.8 cm (64.5\")     Body mass index is 29.91 kg/m².    PHYSICAL EXAM:    General Appearance:   · well developed  · well nourished  HENT:   · oropharynx moist  · lips not cyanotic  Neck:  · thyroid not enlarged  · supple  Respiratory:  · no respiratory distress  · normal breath sounds  · no rales  Cardiovascular:  · no jugular venous distention  · regular rhythm  · apical impulse normal  · S1 normal, S2 normal  · no S3, no S4   · no murmur  · no rub, no thrill  · carotid pulses normal; no bruit  · pedal pulses normal  · lower extremity edema: none    Gastrointestinal:   · bowel sounds normal  · non-tender  · no hepatomegaly, no splenomegaly  Musculoskeletal:  · no clubbing of fingers.   · normocephalic, head atraumatic  Skin:   · warm, dry  Psychiatric:  · judgement and insight " appropriate  · normal mood and affect    RESULTS:     ECG 12 Lead    Date/Time: 9/20/2021 1:38 PM  Performed by: Johnny Rodriguez MD  Authorized by: Johnny Rodriguez MD   Comparison: compared with previous ECG from 1/31/2017  Rhythm: sinus rhythm  Rate: normal  QRS axis: normal  Other findings: non-specific ST-T wave changes    Clinical impression: abnormal EKG            Results for orders placed during the hospital encounter of 01/11/17    Adult Transthoracic Echo Complete    Interpretation Summary  · All left ventricular wall segments contract normally.  · Mild tricuspid valve regurgitation is present.  · Left ventricular function is normal. Estimated EF = 58%.  · Left ventricular diastolic dysfunction (grade I) consistent with impaired relaxation.  · Left ventricular wall thickness is consistent with mild-to-moderate concentric hypertrophy.  · There is no evidence of pericardial effusion.  · Estimated right ventricular systolic pressure from tricuspid regurgitation is normal (<35 mmHg).        Labs:  Lab Results   Component Value Date    AST 19 11/05/2015    ALT 23 11/05/2015     Lab Results   Component Value Date    HGBA1C 9.90 (H) 01/12/2017     No components found for: CREATINININE  eGFR Non  Amer   Date Value Ref Range Status   01/10/2017 72 >60 mL/min/1.73 Final       Most recent PCP note, imaging tests, and labs reviewed.    ASSESSMENT:  Problem List Items Addressed This Visit        Cardiac and Vasculature    Paroxysmal atrial fibrillation (CMS/HCC)    Overview     a. Onset of atrial fibrillation in 2004.   b. Underwent trial of multiple medications, which were discontinued due to intolerance or unresponsiveness.   c. Electrophysiology study by Dr. Mayen. 01/20/2005, showing evidence of focal epicardial, atrial fibrillation emanating from the common pulmonary veins and probably of 1 of the tributaries. Subsequently, ablation procedure was performed, but was unsuccessful according to the  "patient.  Complication with procedure, resulting in cardiac tamponade.  Patient is status post pericardiocentesis.    d. Recurrence of bouts of paroxysmal atrial fibrillation recently with good response to Cardizem.   e. 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 There was transient complete heart block with normalization of conduction at the time of the ablation.  Patient currently now in normal sinus rhythm with completely normalization of conduction with GA interval at 130 ms.              Relevant Orders    Case Request Cath Lab: Left Heart Cath  **hold eliquis 48-hours**    Adult Transthoracic Echo Complete W/ Cont if Necessary Per Protocol    SVT (supraventricular tachycardia) (CMS/[facility]) - Primary    Overview     f. Status post AVNRT, common type ablation on 03/24/2016, with transient complete heart block, now completely normalized conduction.     Frequent short episodes of SVT presenting with a \"weak spells.\"          Chest pain    Relevant Orders    Case Request Cath Lab: Left Heart Cath  **hold eliquis 48-hours**    Adult Transthoracic Echo Complete W/ Cont if Necessary Per Protocol    Hypertension    Relevant Orders    Case Request Cath Lab: Left Heart Cath  **hold eliquis 48-hours**    Adult Transthoracic Echo Complete W/ Cont if Necessary Per Protocol    Dyslipidemia    Relevant Orders    Case Request Cath Lab: Left Heart Cath  **hold eliquis 48-hours**    Adult Transthoracic Echo Complete W/ Cont if Necessary Per Protocol    Pacemaker    Overview     BSC               PLAN:    1.  Chest pain and shortness of breath with exertion:  Concern for progressive angina despite negative nuclear stress test.  Possible balanced ischemia on nuclear stress test  She has ongoing chest pain with exertion which is typical.  After discussing all risk-benefit and alternatives of cardiac catheterization and possible PCI, patient is agreeable.    Schedule left heart catheterization with possible " PCI.    The patient was advised to call 911 if chest pain worsens or persist or rest.    2.  Dyspnea on exertion:  Check echocardiogram at Hospital day of heart cath    3.  PAF:  Hold Eliquis 48 hours prior to cath  Continue follow-up with EP    4.  Third-degree AV block after ablation:  Continue EP follow-up      Return to clinic in 4 months, or sooner as needed.    Thank you for the opportunity to share in the care of your patient; please do not hesitate to call me with any questions.     Johnny Rodriguez MD, [facility]  Office: [phone] [address] [place] [address]    09/20/21

## 2021-09-17 RX ORDER — ESOMEPRAZOLE MAGNESIUM 40 MG/1
CAPSULE, DELAYED RELEASE ORAL
Qty: 180 CAPSULE | Refills: 0 | OUTPATIENT
Start: 2021-09-17

## 2021-09-18 PROCEDURE — 93296 REM INTERROG EVL PM/IDS: CPT | Performed by: STUDENT IN AN ORGANIZED HEALTH CARE EDUCATION/TRAINING PROGRAM

## 2021-09-18 PROCEDURE — 93294 REM INTERROG EVL PM/LDLS PM: CPT | Performed by: STUDENT IN AN ORGANIZED HEALTH CARE EDUCATION/TRAINING PROGRAM

## 2021-09-20 ENCOUNTER — OFFICE VISIT (OUTPATIENT)
Dept: CARDIOLOGY | Facility: CLINIC | Age: 71
End: 2021-09-20

## 2021-09-20 VITALS
OXYGEN SATURATION: 96 % | HEART RATE: 81 BPM | DIASTOLIC BLOOD PRESSURE: 78 MMHG | SYSTOLIC BLOOD PRESSURE: 116 MMHG | WEIGHT: 177 LBS | BODY MASS INDEX: 29.49 KG/M2 | HEIGHT: 65 IN

## 2021-09-20 DIAGNOSIS — R07.9 CHEST PAIN, UNSPECIFIED TYPE: ICD-10-CM

## 2021-09-20 DIAGNOSIS — Z95.0 PACEMAKER: ICD-10-CM

## 2021-09-20 DIAGNOSIS — I47.1 SVT (SUPRAVENTRICULAR TACHYCARDIA) (HCC): Primary | ICD-10-CM

## 2021-09-20 DIAGNOSIS — I10 ESSENTIAL HYPERTENSION: ICD-10-CM

## 2021-09-20 DIAGNOSIS — I48.0 PAROXYSMAL ATRIAL FIBRILLATION (HCC): ICD-10-CM

## 2021-09-20 DIAGNOSIS — E78.5 DYSLIPIDEMIA: ICD-10-CM

## 2021-09-20 PROCEDURE — 93000 ELECTROCARDIOGRAM COMPLETE: CPT | Performed by: INTERNAL MEDICINE

## 2021-09-20 PROCEDURE — 99214 OFFICE O/P EST MOD 30 MIN: CPT | Performed by: INTERNAL MEDICINE

## 2021-09-20 RX ORDER — EMPAGLIFLOZIN 25 MG/1
25 TABLET, FILM COATED ORAL DAILY
COMMUNITY
Start: 2021-09-10

## 2021-09-27 ENCOUNTER — PREP FOR SURGERY (OUTPATIENT)
Dept: OTHER | Facility: HOSPITAL | Age: 71
End: 2021-09-27

## 2021-09-27 DIAGNOSIS — R07.9 CHEST PAIN: Primary | ICD-10-CM

## 2021-09-27 RX ORDER — SODIUM CHLORIDE 0.9 % (FLUSH) 0.9 %
3 SYRINGE (ML) INJECTION EVERY 12 HOURS SCHEDULED
Status: CANCELLED | OUTPATIENT
Start: 2021-09-27

## 2021-09-27 RX ORDER — ONDANSETRON 2 MG/ML
4 INJECTION INTRAMUSCULAR; INTRAVENOUS EVERY 8 HOURS PRN
Status: CANCELLED | OUTPATIENT
Start: 2021-09-27

## 2021-09-27 RX ORDER — ASPIRIN 325 MG
325 TABLET, DELAYED RELEASE (ENTERIC COATED) ORAL DAILY
Status: CANCELLED | OUTPATIENT
Start: 2021-09-28

## 2021-09-27 RX ORDER — NITROGLYCERIN 0.4 MG/1
0.4 TABLET SUBLINGUAL
Status: CANCELLED | OUTPATIENT
Start: 2021-09-27

## 2021-09-27 RX ORDER — ASPIRIN 325 MG
325 TABLET ORAL ONCE
Status: CANCELLED | OUTPATIENT
Start: 2021-09-27 | End: 2021-09-27

## 2021-09-27 RX ORDER — SODIUM CHLORIDE 0.9 % (FLUSH) 0.9 %
10 SYRINGE (ML) INJECTION AS NEEDED
Status: CANCELLED | OUTPATIENT
Start: 2021-09-27

## 2021-10-05 ENCOUNTER — PRE-ADMISSION TESTING (OUTPATIENT)
Dept: PREADMISSION TESTING | Facility: HOSPITAL | Age: 71
End: 2021-10-05

## 2021-10-05 ENCOUNTER — OFFICE VISIT (OUTPATIENT)
Dept: CARDIOLOGY | Facility: CLINIC | Age: 71
End: 2021-10-05

## 2021-10-05 VITALS
BODY MASS INDEX: 30.22 KG/M2 | WEIGHT: 177 LBS | HEART RATE: 84 BPM | HEIGHT: 64 IN | DIASTOLIC BLOOD PRESSURE: 80 MMHG | SYSTOLIC BLOOD PRESSURE: 138 MMHG | OXYGEN SATURATION: 97 %

## 2021-10-05 DIAGNOSIS — I48.0 PAROXYSMAL ATRIAL FIBRILLATION (HCC): Primary | ICD-10-CM

## 2021-10-05 DIAGNOSIS — I44.1 MOBITZ TYPE II ATRIOVENTRICULAR BLOCK: ICD-10-CM

## 2021-10-05 DIAGNOSIS — I10 PRIMARY HYPERTENSION: ICD-10-CM

## 2021-10-05 DIAGNOSIS — Z95.0 PACEMAKER: ICD-10-CM

## 2021-10-05 DIAGNOSIS — R07.9 CHEST PAIN: ICD-10-CM

## 2021-10-05 DIAGNOSIS — I47.1 SVT (SUPRAVENTRICULAR TACHYCARDIA) (HCC): ICD-10-CM

## 2021-10-05 LAB
ALBUMIN SERPL-MCNC: 4.4 G/DL (ref 3.5–5.2)
ALBUMIN/GLOB SERPL: 1.8 G/DL
ALP SERPL-CCNC: 69 U/L (ref 39–117)
ALT SERPL W P-5'-P-CCNC: 19 U/L (ref 1–33)
ANION GAP SERPL CALCULATED.3IONS-SCNC: 11 MMOL/L (ref 5–15)
AST SERPL-CCNC: 16 U/L (ref 1–32)
BASOPHILS # BLD AUTO: 0.03 10*3/MM3 (ref 0–0.2)
BASOPHILS NFR BLD AUTO: 0.5 % (ref 0–1.5)
BILIRUB SERPL-MCNC: <0.2 MG/DL (ref 0–1.2)
BUN SERPL-MCNC: 18 MG/DL (ref 8–23)
BUN/CREAT SERPL: 19.6 (ref 7–25)
CALCIUM SPEC-SCNC: 9.3 MG/DL (ref 8.6–10.5)
CHLORIDE SERPL-SCNC: 106 MMOL/L (ref 98–107)
CO2 SERPL-SCNC: 25 MMOL/L (ref 22–29)
CREAT SERPL-MCNC: 0.92 MG/DL (ref 0.57–1)
DEPRECATED RDW RBC AUTO: 40.4 FL (ref 37–54)
EOSINOPHIL # BLD AUTO: 0.21 10*3/MM3 (ref 0–0.4)
EOSINOPHIL NFR BLD AUTO: 3.5 % (ref 0.3–6.2)
ERYTHROCYTE [DISTWIDTH] IN BLOOD BY AUTOMATED COUNT: 12.6 % (ref 12.3–15.4)
GFR SERPL CREATININE-BSD FRML MDRD: 60 ML/MIN/1.73
GLOBULIN UR ELPH-MCNC: 2.4 GM/DL
GLUCOSE SERPL-MCNC: 120 MG/DL (ref 65–99)
HCT VFR BLD AUTO: 44.9 % (ref 34–46.6)
HGB BLD-MCNC: 14.4 G/DL (ref 12–15.9)
IMM GRANULOCYTES # BLD AUTO: 0.02 10*3/MM3 (ref 0–0.05)
IMM GRANULOCYTES NFR BLD AUTO: 0.3 % (ref 0–0.5)
LYMPHOCYTES # BLD AUTO: 1.42 10*3/MM3 (ref 0.7–3.1)
LYMPHOCYTES NFR BLD AUTO: 23.6 % (ref 19.6–45.3)
MCH RBC QN AUTO: 28.3 PG (ref 26.6–33)
MCHC RBC AUTO-ENTMCNC: 32.1 G/DL (ref 31.5–35.7)
MCV RBC AUTO: 88.4 FL (ref 79–97)
MONOCYTES # BLD AUTO: 0.31 10*3/MM3 (ref 0.1–0.9)
MONOCYTES NFR BLD AUTO: 5.1 % (ref 5–12)
NEUTROPHILS NFR BLD AUTO: 4.03 10*3/MM3 (ref 1.7–7)
NEUTROPHILS NFR BLD AUTO: 67 % (ref 42.7–76)
NRBC BLD AUTO-RTO: 0 /100 WBC (ref 0–0.2)
PLATELET # BLD AUTO: 123 10*3/MM3 (ref 140–450)
PMV BLD AUTO: 10.4 FL (ref 6–12)
POTASSIUM SERPL-SCNC: 4.5 MMOL/L (ref 3.5–5.2)
PROT SERPL-MCNC: 6.8 G/DL (ref 6–8.5)
RBC # BLD AUTO: 5.08 10*6/MM3 (ref 3.77–5.28)
SODIUM SERPL-SCNC: 142 MMOL/L (ref 136–145)
WBC # BLD AUTO: 6.02 10*3/MM3 (ref 3.4–10.8)

## 2021-10-05 PROCEDURE — 80053 COMPREHEN METABOLIC PANEL: CPT

## 2021-10-05 PROCEDURE — 99214 OFFICE O/P EST MOD 30 MIN: CPT | Performed by: STUDENT IN AN ORGANIZED HEALTH CARE EDUCATION/TRAINING PROGRAM

## 2021-10-05 PROCEDURE — 93280 PM DEVICE PROGR EVAL DUAL: CPT | Performed by: STUDENT IN AN ORGANIZED HEALTH CARE EDUCATION/TRAINING PROGRAM

## 2021-10-05 PROCEDURE — 85025 COMPLETE CBC W/AUTO DIFF WBC: CPT

## 2021-10-05 PROCEDURE — 36415 COLL VENOUS BLD VENIPUNCTURE: CPT

## 2021-10-05 NOTE — DISCHARGE INSTRUCTIONS

## 2021-10-05 NOTE — PROGRESS NOTES
Cardiac Electrophysiology Outpatient Note  [facility]    Office Visit     Angela Tovar  [id number]  10/05/2021    Primary Care Physician: Edwina Sibley MD    Referred By: No ref. provider found    Subjective     Chief Complaint   Patient presents with   • PAF   • Hypertension   • Rapid Heart Rate       History of Present Illness:   Angela Tovar is a 71 y.o. female who presents to my electrophysiology clinic for follow up of atrial fibrillation status post ablation in 2005 at an outside institution, SVT status post AVNRT ablation resulting in second-degree AV block, now status post dual-chamber pacemaker.  She was last seen in clinic approximately 1 year ago.  Since that time she is overall done well.  She has had development of some atypical chest symptoms.  She does not have chest pain per se, but does have some dyspnea on exertion.  She had a nuclear stress test that no no evidence of ischemia.  She was recently seen by Dr. Rodriguez, and given the severity of her symptoms he decided to proceed with coronary angiography.  There is some concern that she may have had balanced ischemia on her nuclear stress test.  She is scheduled to undergo angiography next week.  She does have some fatigue at times, but is unsure if this is related to any of her episodes of atrial fibrillation.  She denies any palpitations, lower extremity swelling, orthopnea, PND, presyncope, or syncope.    Past Medical History:   Diagnosis Date   • Arthritis    • Atrial tachycardia (HCC) 9/20/2016   • Back pain    • Chest pain 9/20/2016   • Chronic cough    • Diabetes mellitus (HCC)    • Dyslipidemia 9/20/2016   • GERD (gastroesophageal reflux disease)     Status post Nissen fundoplication x2, with resultant esophageal narrowing.    • Head ache    • Hypertension 9/20/2016   • Hypertriglyceridemia 9/20/2016   • Migraine    • Mobitz type II atrioventricular block    • Osteopenia    •  Paroxysmal atrial fibrillation (HCC) 9/20/2016   • PONV (postoperative nausea and vomiting)    • Snoring 9/20/2016   • Wears eyeglasses        Past Surgical History:   Procedure Laterality Date   • ABLATION OF DYSRHYTHMIC FOCUS  3/2016    1st ablation 2005 I think.   • CARDIAC ABLATION      X 2   • CARDIAC CATHETERIZATION  a few years   • CARDIAC ELECTROPHYSIOLOGY PROCEDURE N/A 1/12/2017    Procedure: Pacemaker DC new;  Surgeon: Jhon Vivas DO;  Location: [facility] INVASIVE LOCATION;  Service:    • COLONOSCOPY      3 years ago   • D & C HYSTEROSCOPY     • HYSTERECTOMY      Hysterectomy at the age of 30.    • INSERT / REPLACE / REMOVE PACEMAKER  1/12/2017   • LIPOMA RESECTION Right     Right leg lipoma resection x7.    • NISSEN FUNDOPLICATION       Nissen fundoplication x2, with resultant esophageal narrowing.     • OTHER SURGICAL HISTORY      Electrophysiology study by Dr. Mayen. 01/20/2005, showing evidence of focal epicardial, atrial fibrillation emanating from the common pulmonary veins and probably of 1 of the tributaries. Subsequently, ablation procedure was performed, but was unsuccessful according to the patient.  Complication with procedure, resulting in cardiac tamponade.  Patient is status post pericardiocentesis. Sta    • PERICARDIOCENTESIS         Family History   Problem Relation Age of Onset   • Heart attack Mother        Social History     Socioeconomic History   • Marital status:      Spouse name: Not on file   • Number of children: Not on file   • Years of education: Not on file   • Highest education level: Not on file   Tobacco Use   • Smoking status: Never Smoker   • Smokeless tobacco: Never Used   Vaping Use   • Vaping Use: Never used   Substance and Sexual Activity   • Alcohol use: No   • Drug use: No   • Sexual activity: Defer         Current Outpatient Medications:   •  apixaban (Eliquis) 5 MG tablet tablet, Take 1 tablet by mouth Every 12 (Twelve) Hours., Disp: 180 tablet,  "Rfl: 3  •  Ascorbic Acid (VITAMIN C PO), Take 1 tablet by mouth Daily., Disp: , Rfl:   •  CALCIUM PO, Take 500 mg by mouth Daily., Disp: , Rfl:   •  Cholecalciferol (VITAMIN D3 PO), Take 1 tablet by mouth Daily. 2000mg, Disp: , Rfl:   •  Cyanocobalamin (VITAMIN B-12 PO), Take 1 tablet by mouth Daily. 2500mcg, Disp: , Rfl:   •  esomeprazole (nexIUM) 40 MG capsule, Take 1 capsule by mouth 2 (Two) Times a Day. Patient needs a appointment prior to next refill., Disp: 180 capsule, Rfl: 0  •  ibuprofen (ADVIL,MOTRIN) 200 MG tablet, Take 200 mg by mouth Every 6 (Six) Hours As Needed for mild pain (1-3)., Disp: , Rfl:   •  Jardiance 25 MG tablet tablet, Take 25 mg by mouth Daily., Disp: , Rfl:   •  Probiotic Product (PROBIOTIC PO), Take 1 tablet by mouth Daily., Disp: , Rfl:   •  TURMERIC PO, Take  by mouth Daily., Disp: , Rfl:   •  NON FORMULARY, \"medication similar to CBD\", Disp: , Rfl:     Allergies:   Allergies   Allergen Reactions   • Codeine Nausea And Vomiting   • Dilaudid [Hydromorphone Hcl] Hallucinations   • Erythromycin Other (See Comments)   • Oxycodone-Acetaminophen Other (See Comments)   • Percocet [Oxycodone-Acetaminophen] Hallucinations   • Sulfa Antibiotics Rash       Objective   Vital Signs: Blood pressure 138/80, pulse 84, height 163.8 cm (64.49\"), weight 80.3 kg (177 lb), SpO2 97 %.    PHYSICAL EXAM  General appearance: Awake, alert, cooperative  Head: Normocephalic, without obvious abnormality, atraumatic  Neck: No JVD  Lungs: Clear to ascultation bilaterally  Heart: Regular rate and rhythm, no murmurs, 2+ LE pulses, no lower extremity swelling  Skin: Skin color, turgor normal, no rashes or lesions  Neurologic: Grossly normal     Lab Results   Component Value Date    GLUCOSE 295 (H) 01/10/2017    CALCIUM 9.9 01/10/2017     01/10/2017    K 4.3 01/10/2017    CO2 34.0 (H) 01/10/2017     01/10/2017    BUN 16 01/10/2017    CREATININE 0.80 01/10/2017    EGFRIFNONA 72 01/10/2017    BCR 20.0 " 01/10/2017    ANIONGAP 5.0 01/10/2017     Lab Results   Component Value Date    WBC 5.21 01/10/2017    HGB 14.2 01/10/2017    HCT 42.0 01/10/2017    MCV 86.1 01/10/2017     (L) 01/10/2017     No results found for: INR, PROTIME  No results found for: TSH, P4NFNHI, B0FTIMS, THYROIDAB       Results for orders placed during the hospital encounter of 01/11/17    Adult Transthoracic Echo Complete    Interpretation Summary  · All left ventricular wall segments contract normally.  · Mild tricuspid valve regurgitation is present.  · Left ventricular function is normal. Estimated EF = 58%.  · Left ventricular diastolic dysfunction (grade I) consistent with impaired relaxation.  · Left ventricular wall thickness is consistent with mild-to-moderate concentric hypertrophy.  · There is no evidence of pericardial effusion.  · Estimated right ventricular systolic pressure from tricuspid regurgitation is normal (<35 mmHg).  Angela Hillpushpa Choudhuryey  reports that she has never smoked. She has never used smokeless tobacco..   I    Advance Care Planning   Advance Care Planning: ACP discussion was held with the patient during this visit. Patient has an advance directive in EMR which is still valid.      Assessment & Plan    1. Paroxysmal atrial fibrillation (HCC)  She has a history of paroxysmal atrial fibrillation.  On her device interrogation today she has less than percent mode switch.  The longest episode was about 3.1 hours.  She is unsure whether she has any symptoms related to this.  She does feel palpitations occasionally, and also has some vague fatigue.  I discussed with her that it would be helpful when she has some of the symptoms to log them so that we can compare them with her device to see if there is any correlation.  She will continue on her Eliquis.    2. Pacemaker  [employer] dual-chamber pacemaker was interrogated and is functioning normally.  She is approximately 5 years of battery life remaining.  She is  pacing 2% time in the atrium 30% of time in the ventricle.  Her pacing and sensing thresholds are all within normal limits.  No changes were made to her settings today.    3. Mobitz type II atrioventricular block  She has intermittent AV block which is well treated by her pacemaker as above.    4. Primary hypertension  Blood pressure was well controlled today.  Not on any medications for this and we will continue to monitor.    5. SVT (supraventricular tachycardia) (HCC)  VT has been well treated by her prior ablation she has not had any recurrence of this.  We will continue to monitor.       Follow Up:  Return in about 1 year (around 10/5/2022) for BSC, Recheck.      Thank you for allowing me to participate in the care of your patient. Please do not hesitate to contact me with additional questions or concerns.      Sukhjinder Boyd M.D.  Cardiac Electrophysiologist  [facility] / [facility]

## 2021-10-12 ENCOUNTER — HOSPITAL ENCOUNTER (OUTPATIENT)
Facility: HOSPITAL | Age: 71
Setting detail: HOSPITAL OUTPATIENT SURGERY
Discharge: HOME | End: 2021-10-12
Attending: INTERNAL MEDICINE | Admitting: INTERNAL MEDICINE

## 2021-10-12 ENCOUNTER — APPOINTMENT (OUTPATIENT)
Dept: CARDIOLOGY | Facility: HOSPITAL | Age: 71
End: 2021-10-12

## 2021-10-12 ENCOUNTER — TELEPHONE (OUTPATIENT)
Dept: CARDIOLOGY | Facility: CLINIC | Age: 71
End: 2021-10-12

## 2021-10-12 VITALS
TEMPERATURE: 98.3 F | HEART RATE: 71 BPM | WEIGHT: 175.49 LBS | OXYGEN SATURATION: 95 % | HEIGHT: 64 IN | BODY MASS INDEX: 29.96 KG/M2 | SYSTOLIC BLOOD PRESSURE: 107 MMHG | RESPIRATION RATE: 16 BRPM | DIASTOLIC BLOOD PRESSURE: 67 MMHG

## 2021-10-12 DIAGNOSIS — R07.9 CHEST PAIN: ICD-10-CM

## 2021-10-12 DIAGNOSIS — R07.9 CHEST PAIN, UNSPECIFIED TYPE: ICD-10-CM

## 2021-10-12 DIAGNOSIS — I10 ESSENTIAL HYPERTENSION: ICD-10-CM

## 2021-10-12 DIAGNOSIS — Q24.5 CORONARY ARTERY ANOMALY, CONGENITAL: Primary | ICD-10-CM

## 2021-10-12 DIAGNOSIS — I48.0 PAROXYSMAL ATRIAL FIBRILLATION (HCC): ICD-10-CM

## 2021-10-12 DIAGNOSIS — E78.5 DYSLIPIDEMIA: ICD-10-CM

## 2021-10-12 LAB
BH CV ECHO MEAS - AO ROOT AREA (BSA CORRECTED): 1.9
BH CV ECHO MEAS - AO ROOT AREA: 9.4 CM^2
BH CV ECHO MEAS - AO ROOT DIAM: 3.5 CM
BH CV ECHO MEAS - BSA(HAYCOCK): 1.9 M^2
BH CV ECHO MEAS - BSA: 1.8 M^2
BH CV ECHO MEAS - BZI_BMI: 30 KILOGRAMS/M^2
BH CV ECHO MEAS - BZI_METRIC_HEIGHT: 162.6 CM
BH CV ECHO MEAS - BZI_METRIC_WEIGHT: 79.4 KG
BH CV ECHO MEAS - EDV(CUBED): 93.6 ML
BH CV ECHO MEAS - EDV(MOD-SP4): 96 ML
BH CV ECHO MEAS - EDV(TEICH): 94.4 ML
BH CV ECHO MEAS - EF(CUBED): 79.2 %
BH CV ECHO MEAS - EF(MOD-SP4): 63.5 %
BH CV ECHO MEAS - EF(TEICH): 71.6 %
BH CV ECHO MEAS - ESV(CUBED): 19.5 ML
BH CV ECHO MEAS - ESV(MOD-SP4): 35 ML
BH CV ECHO MEAS - ESV(TEICH): 26.8 ML
BH CV ECHO MEAS - FS: 40.7 %
BH CV ECHO MEAS - IVS/LVPW: 0.92
BH CV ECHO MEAS - IVSD: 1.1 CM
BH CV ECHO MEAS - LA DIMENSION: 2.8 CM
BH CV ECHO MEAS - LA/AO: 0.81
BH CV ECHO MEAS - LAD MAJOR: 4.5 CM
BH CV ECHO MEAS - LAT PEAK E' VEL: 8.3 CM/SEC
BH CV ECHO MEAS - LATERAL E/E' RATIO: 6.9
BH CV ECHO MEAS - LV DIASTOLIC VOL/BSA (35-75): 51.9 ML/M^2
BH CV ECHO MEAS - LV MASS(C)D: 185.3 GRAMS
BH CV ECHO MEAS - LV MASS(C)DI: 100.3 GRAMS/M^2
BH CV ECHO MEAS - LV MAX PG: 2.1 MMHG
BH CV ECHO MEAS - LV MEAN PG: 0.78 MMHG
BH CV ECHO MEAS - LV SYSTOLIC VOL/BSA (12-30): 18.9 ML/M^2
BH CV ECHO MEAS - LV V1 MAX: 72.1 CM/SEC
BH CV ECHO MEAS - LV V1 MEAN: 39.7 CM/SEC
BH CV ECHO MEAS - LV V1 VTI: 19.6 CM
BH CV ECHO MEAS - LVIDD: 4.5 CM
BH CV ECHO MEAS - LVIDS: 2.7 CM
BH CV ECHO MEAS - LVLD AP4: 8.3 CM
BH CV ECHO MEAS - LVLS AP4: 7.3 CM
BH CV ECHO MEAS - LVOT AREA (M): 2.8 CM^2
BH CV ECHO MEAS - LVOT AREA: 2.9 CM^2
BH CV ECHO MEAS - LVOT DIAM: 1.9 CM
BH CV ECHO MEAS - LVPWD: 1.2 CM
BH CV ECHO MEAS - MED PEAK E' VEL: 5.7 CM/SEC
BH CV ECHO MEAS - MEDIAL E/E' RATIO: 10
BH CV ECHO MEAS - MV A MAX VEL: 72.6 CM/SEC
BH CV ECHO MEAS - MV DEC SLOPE: 345.2 CM/SEC^2
BH CV ECHO MEAS - MV DEC TIME: 0.28 SEC
BH CV ECHO MEAS - MV E MAX VEL: 58.7 CM/SEC
BH CV ECHO MEAS - MV E/A: 0.81
BH CV ECHO MEAS - MV MAX PG: 2.8 MMHG
BH CV ECHO MEAS - MV MEAN PG: 1.5 MMHG
BH CV ECHO MEAS - MV P1/2T MAX VEL: 84.2 CM/SEC
BH CV ECHO MEAS - MV P1/2T: 71.4 MSEC
BH CV ECHO MEAS - MV V2 MAX: 84.2 CM/SEC
BH CV ECHO MEAS - MV V2 MEAN: 56.7 CM/SEC
BH CV ECHO MEAS - MV V2 VTI: 22.2 CM
BH CV ECHO MEAS - MVA P1/2T LCG: 2.6 CM^2
BH CV ECHO MEAS - MVA(P1/2T): 3.1 CM^2
BH CV ECHO MEAS - MVA(VTI): 2.6 CM^2
BH CV ECHO MEAS - PA ACC SLOPE: 688.2 CM/SEC^2
BH CV ECHO MEAS - PA ACC TIME: 0.12 SEC
BH CV ECHO MEAS - PA PR(ACCEL): 26.7 MMHG
BH CV ECHO MEAS - RAP SYSTOLE: 26 MMHG
BH CV ECHO MEAS - RVSP: 3 MMHG
BH CV ECHO MEAS - SI(CUBED): 40.1 ML/M^2
BH CV ECHO MEAS - SI(LVOT): 30.9 ML/M^2
BH CV ECHO MEAS - SI(MOD-SP4): 33 ML/M^2
BH CV ECHO MEAS - SI(TEICH): 36.6 ML/M^2
BH CV ECHO MEAS - SV(CUBED): 74.1 ML
BH CV ECHO MEAS - SV(LVOT): 57 ML
BH CV ECHO MEAS - SV(MOD-SP4): 61 ML
BH CV ECHO MEAS - SV(TEICH): 67.6 ML
BH CV ECHO MEAS - TAPSE (>1.6): 1.7 CM
BH CV ECHO MEAS - TR MAX PG: 23 MMHG
BH CV ECHO MEAS - TR MAX VEL: 238.9 CM/SEC
BH CV ECHO MEASUREMENTS AVERAGE E/E' RATIO: 8.39
BH CV VAS BP LEFT ARM: NORMAL MMHG
BH CV XLRA - RV BASE: 5.4 CM
BH CV XLRA - RV LENGTH: 6.9 CM
BH CV XLRA - RV MID: 2.9 CM
BH CV XLRA - TDI S': 17.8 CM/SEC
CHOLEST SERPL-MCNC: 159 MG/DL (ref 0–200)
CREAT BLDA-MCNC: 0.8 MG/DL (ref 0.6–1.3)
HBA1C MFR BLD: 6.8 % (ref 4.8–5.6)
HDLC SERPL-MCNC: 40 MG/DL (ref 40–60)
LDLC SERPL CALC-MCNC: 97 MG/DL (ref 0–100)
LDLC/HDLC SERPL: 2.35 {RATIO}
MAXIMAL PREDICTED HEART RATE: 150 BPM
STRESS TARGET HR: 128 BPM
TRIGL SERPL-MCNC: 125 MG/DL (ref 0–150)
VLDLC SERPL-MCNC: 22 MG/DL (ref 5–40)

## 2021-10-12 PROCEDURE — 83036 HEMOGLOBIN GLYCOSYLATED A1C: CPT

## 2021-10-12 PROCEDURE — C1894 INTRO/SHEATH, NON-LASER: HCPCS | Performed by: INTERNAL MEDICINE

## 2021-10-12 PROCEDURE — 93306 TTE W/DOPPLER COMPLETE: CPT

## 2021-10-12 PROCEDURE — 25010000002 FENTANYL CITRATE (PF) 50 MCG/ML SOLUTION: Performed by: INTERNAL MEDICINE

## 2021-10-12 PROCEDURE — 93005 ELECTROCARDIOGRAM TRACING: CPT | Performed by: INTERNAL MEDICINE

## 2021-10-12 PROCEDURE — 93458 L HRT ARTERY/VENTRICLE ANGIO: CPT | Performed by: INTERNAL MEDICINE

## 2021-10-12 PROCEDURE — 25010000002 MIDAZOLAM PER 1 MG: Performed by: INTERNAL MEDICINE

## 2021-10-12 PROCEDURE — 93306 TTE W/DOPPLER COMPLETE: CPT | Performed by: INTERNAL MEDICINE

## 2021-10-12 PROCEDURE — 0 IOPAMIDOL PER 1 ML: Performed by: INTERNAL MEDICINE

## 2021-10-12 PROCEDURE — 80061 LIPID PANEL: CPT

## 2021-10-12 PROCEDURE — 82565 ASSAY OF CREATININE: CPT

## 2021-10-12 PROCEDURE — 25010000002 HEPARIN (PORCINE) PER 1000 UNITS: Performed by: INTERNAL MEDICINE

## 2021-10-12 PROCEDURE — C1769 GUIDE WIRE: HCPCS | Performed by: INTERNAL MEDICINE

## 2021-10-12 RX ORDER — ONDANSETRON 2 MG/ML
4 INJECTION INTRAMUSCULAR; INTRAVENOUS EVERY 8 HOURS PRN
Status: DISCONTINUED | OUTPATIENT
Start: 2021-10-12 | End: 2021-10-12 | Stop reason: HOSPADM

## 2021-10-12 RX ORDER — LIDOCAINE HYDROCHLORIDE 10 MG/ML
INJECTION, SOLUTION EPIDURAL; INFILTRATION; INTRACAUDAL; PERINEURAL AS NEEDED
Status: DISCONTINUED | OUTPATIENT
Start: 2021-10-12 | End: 2021-10-12 | Stop reason: HOSPADM

## 2021-10-12 RX ORDER — ACETAMINOPHEN 325 MG/1
325 TABLET ORAL EVERY 6 HOURS PRN
Status: DISCONTINUED | OUTPATIENT
Start: 2021-10-12 | End: 2021-10-12 | Stop reason: HOSPADM

## 2021-10-12 RX ORDER — SODIUM CHLORIDE 0.9 % (FLUSH) 0.9 %
3 SYRINGE (ML) INJECTION EVERY 12 HOURS SCHEDULED
Status: DISCONTINUED | OUTPATIENT
Start: 2021-10-12 | End: 2021-10-12 | Stop reason: HOSPADM

## 2021-10-12 RX ORDER — SODIUM CHLORIDE 9 MG/ML
1 INJECTION, SOLUTION INTRAVENOUS CONTINUOUS
Status: DISCONTINUED | OUTPATIENT
Start: 2021-10-12 | End: 2021-10-12 | Stop reason: HOSPADM

## 2021-10-12 RX ORDER — ASPIRIN 325 MG
325 TABLET ORAL ONCE
Status: COMPLETED | OUTPATIENT
Start: 2021-10-12 | End: 2021-10-12

## 2021-10-12 RX ORDER — SODIUM CHLORIDE 9 MG/ML
3 INJECTION, SOLUTION INTRAVENOUS CONTINUOUS
Status: DISCONTINUED | OUTPATIENT
Start: 2021-10-12 | End: 2021-10-12 | Stop reason: HOSPADM

## 2021-10-12 RX ORDER — NITROGLYCERIN 0.4 MG/1
0.4 TABLET SUBLINGUAL
Status: DISCONTINUED | OUTPATIENT
Start: 2021-10-12 | End: 2021-10-12 | Stop reason: HOSPADM

## 2021-10-12 RX ORDER — FENTANYL CITRATE 50 UG/ML
INJECTION, SOLUTION INTRAMUSCULAR; INTRAVENOUS AS NEEDED
Status: DISCONTINUED | OUTPATIENT
Start: 2021-10-12 | End: 2021-10-12 | Stop reason: HOSPADM

## 2021-10-12 RX ORDER — MIDAZOLAM HYDROCHLORIDE 1 MG/ML
INJECTION INTRAMUSCULAR; INTRAVENOUS AS NEEDED
Status: DISCONTINUED | OUTPATIENT
Start: 2021-10-12 | End: 2021-10-12 | Stop reason: HOSPADM

## 2021-10-12 RX ORDER — SODIUM CHLORIDE 0.9 % (FLUSH) 0.9 %
10 SYRINGE (ML) INJECTION AS NEEDED
Status: DISCONTINUED | OUTPATIENT
Start: 2021-10-12 | End: 2021-10-12 | Stop reason: HOSPADM

## 2021-10-12 RX ORDER — ATORVASTATIN CALCIUM 40 MG/1
40 TABLET, FILM COATED ORAL DAILY
Qty: 90 TABLET | Refills: 3 | Status: SHIPPED | OUTPATIENT
Start: 2021-10-12 | End: 2022-07-25

## 2021-10-12 RX ORDER — ASPIRIN 325 MG
325 TABLET, DELAYED RELEASE (ENTERIC COATED) ORAL DAILY
Status: DISCONTINUED | OUTPATIENT
Start: 2021-10-13 | End: 2021-10-12 | Stop reason: HOSPADM

## 2021-10-12 RX ADMIN — ACETAMINOPHEN 325 MG: 325 TABLET, FILM COATED ORAL at 12:58

## 2021-10-12 RX ADMIN — ASPIRIN 325 MG ORAL TABLET 325 MG: 325 PILL ORAL at 08:01

## 2021-10-12 RX ADMIN — SODIUM CHLORIDE 3 ML/KG/HR: 9 INJECTION, SOLUTION INTRAVENOUS at 08:01

## 2021-10-12 NOTE — TELEPHONE ENCOUNTER
----- Message from Johnny Rodriguez MD sent at 10/12/2021  3:35 PM EDT -----  Please inform the patient of their test results. Thank you.

## 2021-10-12 NOTE — INTERVAL H&P NOTE
H&P updated. The patient was examined and the following changes are noted:      Notably, the patient states that she has recently been diagnosed with type 2 diabetes.  She states that she is currently on Jardiance for cardioprotective aspects.    Allens of right wrist acceptable    The risks, benefits, and alternative options of cardiac catheterization were discussed with the patient.  The risks include death, MI, stroke, infection, vascular injury requiring surgical repair and/or blood transfusion, coronary dissection, renal dysfunction/failure, allergic reaction, emergent CABG.  If PCI is needed there is a 1-2% risk of emergent CABG.  The patient is agreeable for cardiac catheterization, possible PCI or CABG. Plan is to proceed with cardiac catheterization and possible PCI.     Johnny Rodriguez M.D., F.A.C.C.  Interventional Cardiology  10/12/21  08:04 EDT

## 2021-10-16 LAB
QT INTERVAL: 384 MS
QTC INTERVAL: 462 MS

## 2021-11-18 ENCOUNTER — HOSPITAL ENCOUNTER (OUTPATIENT)
Dept: CT IMAGING | Facility: HOSPITAL | Age: 71
Discharge: HOME | End: 2021-11-18
Admitting: INTERNAL MEDICINE

## 2021-11-18 VITALS
HEIGHT: 64 IN | BODY MASS INDEX: 29.74 KG/M2 | WEIGHT: 174.2 LBS | HEART RATE: 64 BPM | SYSTOLIC BLOOD PRESSURE: 123 MMHG | OXYGEN SATURATION: 94 % | DIASTOLIC BLOOD PRESSURE: 71 MMHG

## 2021-11-18 DIAGNOSIS — Q24.5 CORONARY ARTERY ANOMALY, CONGENITAL: ICD-10-CM

## 2021-11-18 LAB — CREAT BLDA-MCNC: 0.8 MG/DL (ref 0.6–1.3)

## 2021-11-18 PROCEDURE — 75574 CT ANGIO HRT W/3D IMAGE: CPT

## 2021-11-18 PROCEDURE — 82565 ASSAY OF CREATININE: CPT

## 2021-11-18 PROCEDURE — 0 IOPAMIDOL PER 1 ML: Performed by: INTERNAL MEDICINE

## 2021-11-18 RX ORDER — NITROGLYCERIN 0.4 MG/1
0.4 TABLET SUBLINGUAL
Status: COMPLETED | OUTPATIENT
Start: 2021-11-18 | End: 2021-11-18

## 2021-11-18 RX ORDER — LIDOCAINE HYDROCHLORIDE 10 MG/ML
5 INJECTION, SOLUTION EPIDURAL; INFILTRATION; INTRACAUDAL; PERINEURAL AS NEEDED
Status: DISCONTINUED | OUTPATIENT
Start: 2021-11-18 | End: 2021-11-19 | Stop reason: HOSPADM

## 2021-11-18 RX ORDER — SODIUM CHLORIDE 0.9 % (FLUSH) 0.9 %
3 SYRINGE (ML) INJECTION EVERY 12 HOURS SCHEDULED
Status: DISCONTINUED | OUTPATIENT
Start: 2021-11-18 | End: 2021-11-19 | Stop reason: HOSPADM

## 2021-11-18 RX ORDER — SODIUM CHLORIDE 0.9 % (FLUSH) 0.9 %
10 SYRINGE (ML) INJECTION AS NEEDED
Status: DISCONTINUED | OUTPATIENT
Start: 2021-11-18 | End: 2021-11-19 | Stop reason: HOSPADM

## 2021-11-18 RX ORDER — METOPROLOL TARTRATE 50 MG/1
100 TABLET, FILM COATED ORAL ONCE AS NEEDED
Status: COMPLETED | OUTPATIENT
Start: 2021-11-18 | End: 2021-11-18

## 2021-11-18 RX ORDER — NITROGLYCERIN 0.4 MG/1
TABLET SUBLINGUAL
Status: COMPLETED
Start: 2021-11-18 | End: 2021-11-18

## 2021-11-18 RX ORDER — METOPROLOL TARTRATE 50 MG/1
50 TABLET, FILM COATED ORAL ONCE AS NEEDED
Status: COMPLETED | OUTPATIENT
Start: 2021-11-18 | End: 2021-11-18

## 2021-11-18 RX ADMIN — METOPROLOL TARTRATE 50 MG: 50 TABLET, FILM COATED ORAL at 09:12

## 2021-11-18 RX ADMIN — NITROGLYCERIN 0.4 MG: 0.4 TABLET SUBLINGUAL at 10:35

## 2021-11-18 RX ADMIN — IOPAMIDOL 100 ML: 755 INJECTION, SOLUTION INTRAVENOUS at 14:11

## 2021-11-22 NOTE — PROGRESS NOTES
Please inform the patient of their test results.  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  She does have a hiatal hernia that is moderate to large on the CT scan should follow-up with her PCP regarding this thank you.

## 2021-11-23 ENCOUNTER — TELEPHONE (OUTPATIENT)
Dept: CARDIOLOGY | Facility: CLINIC | Age: 71
End: 2021-11-23

## 2021-11-23 NOTE — TELEPHONE ENCOUNTER
Spoke with pt regarding CT scan and hiatal hernia. She states she has known about this for awhile and follows Dr. Valverde for GI. She is going to [place] till Feb and will f/u when she gets back home.

## 2021-11-23 NOTE — TELEPHONE ENCOUNTER
----- Message from Johnny Rodriguez MD sent at 11/22/2021  3:50 PM EST -----  Please inform the patient of their test results.  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  She does have a hiatal hernia that is moderate to large on the CT scan should follow-up with her PCP regarding this thank you.

## 2021-12-18 PROCEDURE — 93296 REM INTERROG EVL PM/IDS: CPT | Performed by: STUDENT IN AN ORGANIZED HEALTH CARE EDUCATION/TRAINING PROGRAM

## 2021-12-18 PROCEDURE — 93294 REM INTERROG EVL PM/LDLS PM: CPT | Performed by: STUDENT IN AN ORGANIZED HEALTH CARE EDUCATION/TRAINING PROGRAM

## 2022-02-09 ENCOUNTER — OUTSIDE FACILITY SERVICE (OUTPATIENT)
Dept: GASTROENTEROLOGY | Facility: CLINIC | Age: 72
End: 2022-02-09

## 2022-02-09 PROCEDURE — 43239 EGD BIOPSY SINGLE/MULTIPLE: CPT | Performed by: INTERNAL MEDICINE

## 2022-02-09 PROCEDURE — 43249 ESOPH EGD DILATION <30 MM: CPT | Performed by: INTERNAL MEDICINE

## 2022-02-09 RX ORDER — ESOMEPRAZOLE MAGNESIUM 40 MG/1
40 CAPSULE, DELAYED RELEASE ORAL 2 TIMES DAILY
Qty: 180 CAPSULE | Refills: 3 | Status: SHIPPED | OUTPATIENT
Start: 2022-02-09

## 2022-03-19 PROCEDURE — 93294 REM INTERROG EVL PM/LDLS PM: CPT | Performed by: STUDENT IN AN ORGANIZED HEALTH CARE EDUCATION/TRAINING PROGRAM

## 2022-03-19 PROCEDURE — 93296 REM INTERROG EVL PM/IDS: CPT | Performed by: STUDENT IN AN ORGANIZED HEALTH CARE EDUCATION/TRAINING PROGRAM

## 2022-06-17 PROBLEM — R06.09 DOE (DYSPNEA ON EXERTION): Status: ACTIVE | Noted: 2022-06-17

## 2022-07-22 NOTE — PROGRESS NOTES
OFFICE VISIT  NOTE  [facility] CARDIOLOGY      Name: Angela Tovar    Date: 2022  MRN:  [id number]  :  1950      REFERRING/PRIMARY PROVIDER:  Edwina Sibley MD    Chief Complaint   Patient presents with   • SVT f/u       HPI: Angela Tovar is a 71 y.o. female who presents today for follow-up for chest pain.  Chest pain described as substernal, pressure, with exertion such as going [place] in her neighborhood..  She also has a history of PAF status post ablation by Dr. Albarran and pacemaker followed by EP.  [facility] 10/2021 at [facility], normal circumflex 20-30% circumflex and LAD disease.  Recommend to start atorvastatin after that but she declined would like to work on lifestyle.  Also had a coronary CTA to ensure no on intra arterial course of circumflex which it did.  Still has chest pain with exertion but minimal does not want start medication for it.    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    • Head ache    • Hypertension 2016   • Hypertriglyceridemia 2016   • Migraine    • Mobitz type II atrioventricular block    • Osteopenia    • Paroxysmal atrial fibrillation (HCC) 2016   • PONV (postoperative nausea and vomiting)    • Snoring 2016   • Wears eyeglasses        Past Surgical History:   Procedure Laterality Date   • ABLATION OF DYSRHYTHMIC FOCUS  3/2016    1st ablation  I think.   • CARDIAC ABLATION      X 2   • CARDIAC CATHETERIZATION  a few years   • CARDIAC CATHETERIZATION Left 10/12/2021    Procedure: Left Heart Cath **hold eliquis 48-hours**;  Surgeon: Johnny Rodriguez MD;  Location: [facility] INVASIVE  LOCATION;  Service: Cardiovascular;  Laterality: Left;  **hold eliquis 48-hours**   • CARDIAC ELECTROPHYSIOLOGY PROCEDURE N/A 1/12/2017    Procedure: Pacemaker DC new;  Surgeon: Jhon Vivas DO;  Location: [facility] INVASIVE LOCATION;  Service:    • COLONOSCOPY      3 years ago   • D & C HYSTEROSCOPY     • HYSTERECTOMY      Hysterectomy at the age of 30.    • INSERT / REPLACE / REMOVE PACEMAKER  1/12/2017   • LIPOMA RESECTION Right     Right leg lipoma resection x7.    • NISSEN FUNDOPLICATION       Nissen fundoplication x2, with resultant esophageal narrowing.     • OTHER SURGICAL HISTORY      Electrophysiology study by Dr. Mayen. 01/20/2005, showing evidence of focal epicardial, atrial fibrillation emanating from the common pulmonary veins and probably of 1 of the tributaries. Subsequently, ablation procedure was performed, but was unsuccessful according to the patient.  Complication with procedure, resulting in cardiac tamponade.  Patient is status post pericardiocentesis. Sta    • PERICARDIOCENTESIS         Social History     Socioeconomic History   • Marital status:    Tobacco Use   • Smoking status: Never Smoker   • Smokeless tobacco: Never Used   Vaping Use   • Vaping Use: Never used   Substance and Sexual Activity   • Alcohol use: No   • Drug use: No   • Sexual activity: Defer       Family History   Problem Relation Age of Onset   • Heart attack Mother         ROS:   Constitutional no fever,  no weight loss   Skin no rash, no subcutaneous nodules   Otolaryngeal no difficulty swallowing   Cardiovascular See HPI   Pulmonary no cough, no sputum production   Gastrointestinal no constipation, no diarrhea   Genitourinary no dysuria, no hematuria   Hematologic no easy bruisability, no abnormal bleeding   Musculoskeletal no muscle pain   Neurologic no dizziness, no falls         Allergies   Allergen Reactions   • Codeine Nausea And Vomiting   • Dilaudid [Hydromorphone Hcl] Hallucinations   •  "Erythromycin Other (See Comments)     Nausea or rash,  uncertain   • Oxycodone-Acetaminophen Other (See Comments)     hallucinations   • Percocet [Oxycodone-Acetaminophen] Hallucinations   • Sulfa Antibiotics Rash         Current Outpatient Medications:   •  apixaban (Eliquis) 5 MG tablet tablet, Take 1 tablet by mouth Every 12 (Twelve) Hours. (Patient taking differently: Take 5 mg by mouth Every 12 (Twelve) Hours. Will hold 48 hours prior to heart cath.), Disp: 180 tablet, Rfl: 3  •  APPLE CIDER VINEGAR PO, Take  by mouth Daily., Disp: , Rfl:   •  Ascorbic Acid (VITAMIN C PO), Take 1 tablet by mouth Daily., Disp: , Rfl:   •  CALCIUM PO, Take 500 mg by mouth Daily., Disp: , Rfl:   •  Cholecalciferol (VITAMIN D3 PO), Take 1 tablet by mouth Daily. 2000mg, Disp: , Rfl:   •  CINNAMON PO, Take  by mouth Daily., Disp: , Rfl:   •  Cyanocobalamin (VITAMIN B-12 PO), Take 1 tablet by mouth Daily. 2500mcg, Disp: , Rfl:   •  esomeprazole (nexIUM) 40 MG capsule, Take 1 capsule by mouth 2 (Two) Times a Day., Disp: 180 capsule, Rfl: 3  •  ibuprofen (ADVIL,MOTRIN) 200 MG tablet, Take 200 mg by mouth Every 6 (Six) Hours As Needed for mild pain (1-3)., Disp: , Rfl:   •  Jardiance 25 MG tablet tablet, Take 25 mg by mouth Daily., Disp: , Rfl:   •  NON FORMULARY, \"medication similar to CBD\", Disp: , Rfl:   •  Probiotic Product (PROBIOTIC PO), Take 1 tablet by mouth Daily., Disp: , Rfl:   •  TURMERIC PO, Take  by mouth Daily., Disp: , Rfl:   •  vitamin C (ASCORBIC ACID) 250 MG tablet, Take 250 mg by mouth Daily., Disp: , Rfl:     Vitals:    07/25/22 1023   BP: 130/80   BP Location: Right arm   Patient Position: Sitting   Pulse: 80   SpO2: 96%   Weight: 78 kg (172 lb)   Height: 163.8 cm (64.5\")     Body mass index is 29.07 kg/m².    PHYSICAL EXAM:    General Appearance:   · well developed  · well nourished  HENT:   · oropharynx moist  · lips not cyanotic  Neck:  · thyroid not enlarged  · supple  Respiratory:  · no respiratory " distress  · normal breath sounds  · no rales  Cardiovascular:  · no jugular venous distention  · regular rhythm  · apical impulse normal  · S1 normal, S2 normal  · no S3, no S4   · no murmur  · no rub, no thrill  · carotid pulses normal; no bruit  · pedal pulses normal  · lower extremity edema: none    Gastrointestinal:   · bowel sounds normal  · non-tender  · no hepatomegaly, no splenomegaly  Musculoskeletal:  · no clubbing of fingers.   · normocephalic, head atraumatic  Skin:   · warm, dry  Psychiatric:  · judgement and insight appropriate  · normal mood and affect    RESULTS:   Procedures    Results for orders placed during the hospital encounter of 10/12/21    Adult Transthoracic Echo Complete W/ Cont if Necessary Per Protocol    Interpretation Summary  · Left ventricular ejection fraction appears to be 61 - 65%. Left ventricular systolic function is normal.  · Estimated right ventricular systolic pressure from tricuspid regurgitation is normal (<35 mmHg).  · No significant structural or functional valvular disease.        Labs:  Lab Results   Component Value Date    CHOL 159 10/12/2021    TRIG 125 10/12/2021    HDL 40 10/12/2021    LDL 97 10/12/2021    AST 16 10/05/2021    ALT 19 10/05/2021     Lab Results   Component Value Date    HGBA1C 6.80 (H) 10/12/2021     No components found for: CREATINININE  eGFR Non  Amer   Date Value Ref Range Status   10/05/2021 60 (L) >60 mL/min/1.73 Final   01/10/2017 72 >60 mL/min/1.73 Final       Most recent PCP note, imaging tests, and labs reviewed.  I personally reviewed her cath films and coronary CTA films, interpreted, left circumflex arises from right coronary cusp, not interarterial course.    ASSESSMENT:  Problem List Items Addressed This Visit        Cardiac and Vasculature    Paroxysmal atrial fibrillation (HCC) - Primary    Overview     a. Onset of atrial fibrillation in 2004.   b. Underwent trial of multiple medications, which were discontinued due to  intolerance or unresponsiveness.   c. Electrophysiology study by Dr. Mayen. 01/20/2005, showing evidence of focal epicardial, atrial fibrillation emanating from the common pulmonary veins and probably of 1 of the tributaries. Subsequently, ablation procedure was performed, but was unsuccessful according to the patient.  Complication with procedure, resulting in cardiac tamponade.  Patient is status post pericardiocentesis.    d. Recurrence of bouts of paroxysmal atrial fibrillation recently with good response to Cardizem.   e. 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 There was transient complete heart block with normalization of conduction at the time of the ablation.  Patient currently now in normal sinus rhythm with completely normalization of conduction with AR interval at 130 ms.                Chest pain    Hypertension    Pacemaker    Overview     [facility]           Coronary artery anomaly, congenital    Overview     11/18/21 Coronary CTA: Calcium score calculated at 380.4  [facility] 10/12/2021: Left circumflex arising from the right coronary cusp, with proximal 30-40% stenosis at an acute.  CTA ordered.  No significant stenosis of the native coronaries.           RIOS (dyspnea on exertion)    Overview     10/12/21 Echo: EF 61-65%, structurally normal                     PLAN:    1.  Chest pain and shortness of breath with exertion:  [facility] 10/21/21, no significant stenosis of the native coronaries.   Coronary CTA 11/18/21 confirmed circumflex arising from right coronary cusp, does not course between the aorta and pulmonary arteries.    May have a component of microvascular angina, I recommended amlodipine but she would like to hold off on adding any new medicines at this time.    The patient was advised to call 911 if chest pain worsens or persist or rest.    2.  Hyperlipidemia:  I recommend atorvastatin 20 or 40 mg daily due to minimal plaque on cath, she would like to work on lifestyle for  now does not want to start new medicine.    3.  PAF:  Hold Eliquis 48 hours prior to cath  Continue follow-up with EP    4.  Third-degree AV block after ablation:  Continue EP follow-up    5.  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      Return to clinic in 12 months, or sooner as needed.    Thank you for the opportunity to share in the care of your patient; please do not hesitate to call me with any questions.     Johnny Rodriguez MD, [facility]  Office: [phone] [address] [place] [address]    07/25/22

## 2022-07-25 ENCOUNTER — OFFICE VISIT (OUTPATIENT)
Dept: CARDIOLOGY | Facility: CLINIC | Age: 72
End: 2022-07-25

## 2022-07-25 VITALS
WEIGHT: 172 LBS | SYSTOLIC BLOOD PRESSURE: 130 MMHG | OXYGEN SATURATION: 96 % | BODY MASS INDEX: 28.66 KG/M2 | HEIGHT: 65 IN | HEART RATE: 80 BPM | DIASTOLIC BLOOD PRESSURE: 80 MMHG

## 2022-07-25 DIAGNOSIS — R06.09 DOE (DYSPNEA ON EXERTION): ICD-10-CM

## 2022-07-25 DIAGNOSIS — Q24.5 CORONARY ARTERY ANOMALY, CONGENITAL: ICD-10-CM

## 2022-07-25 DIAGNOSIS — R07.9 CHEST PAIN, UNSPECIFIED TYPE: ICD-10-CM

## 2022-07-25 DIAGNOSIS — Z95.0 PACEMAKER: ICD-10-CM

## 2022-07-25 DIAGNOSIS — I10 PRIMARY HYPERTENSION: ICD-10-CM

## 2022-07-25 DIAGNOSIS — I48.0 PAROXYSMAL ATRIAL FIBRILLATION: Primary | ICD-10-CM

## 2022-07-25 PROCEDURE — 99214 OFFICE O/P EST MOD 30 MIN: CPT | Performed by: INTERNAL MEDICINE

## 2022-07-25 RX ORDER — MULTIVIT WITH MINERALS/LUTEIN
250 TABLET ORAL DAILY
COMMUNITY

## 2022-08-31 ENCOUNTER — TELEPHONE (OUTPATIENT)
Dept: CARDIOLOGY | Facility: CLINIC | Age: 72
End: 2022-08-31

## 2022-08-31 NOTE — TELEPHONE ENCOUNTER
----- Message from Angela Tovar sent at 8/30/2022  5:39 PM EDT -----  Regarding: Appointment   On license of [facility]. I have an appointment Thursday 10/27/22 at 11:15am with Dr Boyd in the [place] office. I will be out of the [place] and need to reschedule. I prefer the [place] office. I hope it works to send message here. Thank you.  Angela Tovar  [phone]

## 2022-09-17 PROCEDURE — 93296 REM INTERROG EVL PM/IDS: CPT | Performed by: STUDENT IN AN ORGANIZED HEALTH CARE EDUCATION/TRAINING PROGRAM

## 2022-09-17 PROCEDURE — 93294 REM INTERROG EVL PM/LDLS PM: CPT | Performed by: STUDENT IN AN ORGANIZED HEALTH CARE EDUCATION/TRAINING PROGRAM

## 2022-11-14 RX ORDER — APIXABAN 5 MG/1
TABLET, FILM COATED ORAL
Qty: 180 TABLET | Refills: 3 | Status: SHIPPED | OUTPATIENT
Start: 2022-11-14

## 2022-12-17 PROCEDURE — 93296 REM INTERROG EVL PM/IDS: CPT | Performed by: STUDENT IN AN ORGANIZED HEALTH CARE EDUCATION/TRAINING PROGRAM

## 2022-12-17 PROCEDURE — 93294 REM INTERROG EVL PM/LDLS PM: CPT | Performed by: STUDENT IN AN ORGANIZED HEALTH CARE EDUCATION/TRAINING PROGRAM

## 2022-12-22 ENCOUNTER — OFFICE VISIT (OUTPATIENT)
Dept: CARDIOLOGY | Facility: CLINIC | Age: 72
End: 2022-12-22

## 2022-12-22 VITALS
BODY MASS INDEX: 28.82 KG/M2 | HEART RATE: 80 BPM | DIASTOLIC BLOOD PRESSURE: 80 MMHG | OXYGEN SATURATION: 96 % | SYSTOLIC BLOOD PRESSURE: 150 MMHG | WEIGHT: 173 LBS | HEIGHT: 65 IN

## 2022-12-22 DIAGNOSIS — I48.0 PAROXYSMAL ATRIAL FIBRILLATION: Primary | ICD-10-CM

## 2022-12-22 DIAGNOSIS — Z95.0 PACEMAKER: ICD-10-CM

## 2022-12-22 PROCEDURE — 99214 OFFICE O/P EST MOD 30 MIN: CPT | Performed by: STUDENT IN AN ORGANIZED HEALTH CARE EDUCATION/TRAINING PROGRAM

## 2022-12-22 PROCEDURE — 93280 PM DEVICE PROGR EVAL DUAL: CPT | Performed by: STUDENT IN AN ORGANIZED HEALTH CARE EDUCATION/TRAINING PROGRAM

## 2022-12-22 NOTE — PROGRESS NOTES
Cardiac Electrophysiology Outpatient Note  [facility]    Office Visit     Angela Tovar  [id number]  10/05/2021    Primary Care Physician: Edwina Sibley MD    Referred By: No ref. provider found    Subjective     Chief Complaint   Patient presents with   • Paroxysmal atrial fibrillation (HCC)       History of Present Illness:   Angela Tovar is a 72 y.o. female who presents to my electrophysiology clinic for follow up of atrial fibrillation status post ablation in 2005 at an outside institution, resulting in pericardial effusion with prolonged hospital stay.  She also had SVT status post AVNRT ablation resulting in second-degree AV block, now status post dual-chamber pacemaker.  She was last seen in clinic approximately 1 year ago.  Since that time she is overall done well.  She does notice some palpitations from time to time.  These seem to occur sometimes after eating.  She will notice it up in her neck or feeling abnormal or hard heartbeat.  She denies any other new symptoms.      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    • Head ache    • Hypertension 09/20/2016   • Hypertriglyceridemia 09/20/2016   • Migraine    • Mobitz type II atrioventricular block    • Osteopenia    • Paroxysmal atrial fibrillation (HCC) 09/20/2016   • PONV (postoperative nausea and vomiting)    • Snoring 09/20/2016   • Wears eyeglasses        Past Surgical History:   Procedure Laterality Date   • ABLATION OF DYSRHYTHMIC FOCUS  3/2016    1st ablation 2005 I think.   • CARDIAC ABLATION      X 2   • CARDIAC CATHETERIZATION  a few years   •  CARDIAC CATHETERIZATION Left 10/12/2021    Procedure: Left Heart Cath **hold eliquis 48-hours**;  Surgeon: Johnny Rodriguez MD;  Location:  [facility] INVASIVE LOCATION;  Service: Cardiovascular;  Laterality: Left;  **hold eliquis 48-hours**   • CARDIAC ELECTROPHYSIOLOGY PROCEDURE N/A 1/12/2017    Procedure: Pacemaker DC new;  Surgeon: Jhon Vivas DO;  Location:  [facility] INVASIVE LOCATION;  Service:    • COLONOSCOPY      3 years ago   • D & C HYSTEROSCOPY     • HYSTERECTOMY      Hysterectomy at the age of 30.    • INSERT / REPLACE / REMOVE PACEMAKER  1/12/2017   • LIPOMA RESECTION Right     Right leg lipoma resection x7.    • NISSEN FUNDOPLICATION       Nissen fundoplication x2, with resultant esophageal narrowing.     • OTHER SURGICAL HISTORY      Electrophysiology study by Dr. Mayen. 01/20/2005, showing evidence of focal epicardial, atrial fibrillation emanating from the common pulmonary veins and probably of 1 of the tributaries. Subsequently, ablation procedure was performed, but was unsuccessful according to the patient.  Complication with procedure, resulting in cardiac tamponade.  Patient is status post pericardiocentesis. Sta    • PERICARDIOCENTESIS         Family History   Problem Relation Age of Onset   • Heart attack Mother        Social History     Socioeconomic History   • Marital status:    Tobacco Use   • Smoking status: Never   • Smokeless tobacco: Never   Vaping Use   • Vaping Use: Never used   Substance and Sexual Activity   • Alcohol use: No   • Drug use: No   • Sexual activity: Defer         Current Outpatient Medications:   •  APPLE CIDER VINEGAR PO, Take  by mouth Daily., Disp: , Rfl:   •  Ascorbic Acid (VITAMIN C PO), Take 1 tablet by mouth Daily., Disp: , Rfl:   •  CALCIUM PO, Take 500 mg by mouth Daily., Disp: , Rfl:   •  Cholecalciferol (VITAMIN D3 PO), Take 1 tablet by mouth Daily. 2000mg, Disp: , Rfl:   •  CINNAMON PO, Take  by mouth Daily., Disp: , Rfl:   •   "Cyanocobalamin (VITAMIN B-12 PO), Take 1 tablet by mouth Daily. 2500mcg, Disp: , Rfl:   •  Eliquis 5 MG tablet tablet, TAKE 1 TABLET EVERY 12 HOURS, Disp: 180 tablet, Rfl: 3  •  esomeprazole (nexIUM) 40 MG capsule, Take 1 capsule by mouth 2 (Two) Times a Day., Disp: 180 capsule, Rfl: 3  •  ibuprofen (ADVIL,MOTRIN) 200 MG tablet, Take 200 mg by mouth Every 6 (Six) Hours As Needed for mild pain (1-3)., Disp: , Rfl:   •  Jardiance 25 MG tablet tablet, Take 25 mg by mouth Daily., Disp: , Rfl:   •  vitamin C (ASCORBIC ACID) 250 MG tablet, Take 250 mg by mouth Daily., Disp: , Rfl:     Allergies:   Allergies   Allergen Reactions   • Dilaudid [Hydromorphone Hcl] Hallucinations   • Erythromycin Other (See Comments)     Nausea or rash,  uncertain   • Oxycodone-Acetaminophen Other (See Comments)     hallucinations   • Percocet [Oxycodone-Acetaminophen] Hallucinations   • Codeine Nausea And Vomiting   • Sulfa Antibiotics Rash       Objective   Vital Signs: Blood pressure 150/80, pulse 80, height 163.8 cm (64.5\"), weight 78.5 kg (173 lb), SpO2 96 %.    PHYSICAL EXAM  General appearance: Awake, alert, cooperative  Head: Normocephalic, without obvious abnormality, atraumatic  Neck: No JVD  Lungs: Clear to ascultation bilaterally  Heart: Regular rate and rhythm, no murmurs, 2+ LE pulses, no lower extremity swelling  Skin: Skin color, turgor normal, no rashes or lesions  Neurologic: Grossly normal     Lab Results   Component Value Date    GLUCOSE 120 (H) 10/05/2021    CALCIUM 9.3 10/05/2021     10/05/2021    K 4.5 10/05/2021    CO2 25.0 10/05/2021     10/05/2021    BUN 18 10/05/2021    CREATININE 0.80 11/18/2021    EGFRIFNONA 60 (L) 10/05/2021    BCR 19.6 10/05/2021    ANIONGAP 11.0 10/05/2021     Lab Results   Component Value Date    WBC 6.02 10/05/2021    HGB 14.4 10/05/2021    HCT 44.9 10/05/2021    MCV 88.4 10/05/2021     (L) 10/05/2021     No results found for: INR, PROTIME  No results found for: TSH, " H9HSREV, I2EYWAD, THYROIDAB       Results for orders placed during the hospital encounter of 10/12/21    Adult Transthoracic Echo Complete W/ Cont if Necessary Per Protocol    Interpretation Summary  · Left ventricular ejection fraction appears to be 61 - 65%. Left ventricular systolic function is normal.  · Estimated right ventricular systolic pressure from tricuspid regurgitation is normal (<35 mmHg).  · No significant structural or functional valvular disease.  Angela Tovar  reports that she has never smoked. She has never used smokeless tobacco..   I    Advance Care Planning   Advance Care Planning: ACP discussion was held with the patient during this visit. Patient has an advance directive in EMR which is still valid.      Assessment & Plan    1. Paroxysmal atrial fibrillation (HCC)  She has a history of paroxysmal atrial fibrillation.  She does have an increased burden of atrial fibrillation on her device.  This is increased from less than 1% to 4% over the past year.  Her longest episode appears to be around 9 hours.  She does seem to have some symptoms with this.  She is on Eliquis for anticoagulation.    We a long discussion about options going forward for this.  This included an option of continued monitoring, antiarrhythmic therapy, or catheter ablation.  I think she would actually be a good candidate for an ablation, however she is very hesitant to do this.  This is very understandable given her prior complications with ablation in the past.  For now she elected to continue monitoring.     2. Pacemaker  [employer] dual-chamber pacemaker was interrogated and is functioning normally.  She is approximately 3.5 years of battery life remaining.  She is pacing 2% time in the atrium 31% of time in the ventricle.  Her pacing and sensing thresholds are all within normal limits.  No changes were made to her settings today.    3. Mobitz type II atrioventricular block  She has intermittent AV block  which is well treated by her pacemaker as above.    4. Primary hypertension  Blood pressure was elevated today.  We discussed the importance of home blood pressure monitoring going forward.    5. SVT (supraventricular tachycardia) (HCC)  SVT has been well treated by her prior ablation she has not had any recurrence of this.  We will continue to monitor.       Follow Up:  Return in about 1 year (around 12/22/2023).      Thank you for allowing me to participate in the care of your patient. Please do not hesitate to contact me with additional questions or concerns.      Sukhjinder Boyd M.D.  Cardiac Electrophysiologist  [facility]

## 2023-03-09 ENCOUNTER — TRANSCRIBE ORDERS (OUTPATIENT)
Dept: CT IMAGING | Facility: CLINIC | Age: 73
End: 2023-03-09
Payer: MEDICARE

## 2023-03-09 DIAGNOSIS — R05.3 CHRONIC COUGH: Primary | ICD-10-CM

## 2023-03-18 PROCEDURE — 93296 REM INTERROG EVL PM/IDS: CPT | Performed by: STUDENT IN AN ORGANIZED HEALTH CARE EDUCATION/TRAINING PROGRAM

## 2023-03-18 PROCEDURE — 93294 REM INTERROG EVL PM/LDLS PM: CPT | Performed by: STUDENT IN AN ORGANIZED HEALTH CARE EDUCATION/TRAINING PROGRAM

## 2023-04-11 RX ORDER — ESOMEPRAZOLE MAGNESIUM 40 MG/1
CAPSULE, DELAYED RELEASE ORAL
Qty: 180 CAPSULE | Refills: 3 | Status: SHIPPED | OUTPATIENT
Start: 2023-04-11

## 2023-07-07 PROCEDURE — 88305 TISSUE EXAM BY PATHOLOGIST: CPT

## 2023-07-10 ENCOUNTER — LAB REQUISITION (OUTPATIENT)
Dept: LAB | Facility: HOSPITAL | Age: 73
End: 2023-07-10
Payer: MEDICARE

## 2023-07-10 DIAGNOSIS — K44.9 DIAPHRAGMATIC HERNIA WITHOUT OBSTRUCTION OR GANGRENE: ICD-10-CM

## 2023-07-10 DIAGNOSIS — K22.2 ESOPHAGEAL OBSTRUCTION: ICD-10-CM

## 2023-07-10 DIAGNOSIS — R13.10 DYSPHAGIA, UNSPECIFIED: ICD-10-CM

## 2023-07-10 DIAGNOSIS — K91.89 OTHER POSTPROCEDURAL COMPLICATIONS AND DISORDERS OF DIGESTIVE SYSTEM: ICD-10-CM

## 2023-07-11 LAB — REF LAB TEST METHOD: NORMAL

## 2023-08-07 ENCOUNTER — OFFICE VISIT (OUTPATIENT)
Dept: CARDIOLOGY | Facility: CLINIC | Age: 73
End: 2023-08-07
Payer: MEDICARE

## 2023-08-07 VITALS
OXYGEN SATURATION: 94 % | BODY MASS INDEX: 29.32 KG/M2 | HEART RATE: 83 BPM | HEIGHT: 65 IN | DIASTOLIC BLOOD PRESSURE: 76 MMHG | SYSTOLIC BLOOD PRESSURE: 138 MMHG | WEIGHT: 176 LBS

## 2023-08-07 DIAGNOSIS — R07.9 CHEST PAIN, UNSPECIFIED TYPE: ICD-10-CM

## 2023-08-07 DIAGNOSIS — Z95.0 PACEMAKER: ICD-10-CM

## 2023-08-07 DIAGNOSIS — I48.0 PAROXYSMAL ATRIAL FIBRILLATION: Primary | ICD-10-CM

## 2023-08-07 DIAGNOSIS — I44.1 MOBITZ TYPE II ATRIOVENTRICULAR BLOCK: ICD-10-CM

## 2023-08-07 DIAGNOSIS — I47.1 SVT (SUPRAVENTRICULAR TACHYCARDIA): ICD-10-CM

## 2023-08-07 PROCEDURE — 99214 OFFICE O/P EST MOD 30 MIN: CPT | Performed by: INTERNAL MEDICINE

## 2023-08-07 PROCEDURE — 3078F DIAST BP <80 MM HG: CPT | Performed by: INTERNAL MEDICINE

## 2023-08-07 PROCEDURE — 3075F SYST BP GE 130 - 139MM HG: CPT | Performed by: INTERNAL MEDICINE

## 2023-08-07 NOTE — PROGRESS NOTES
OFFICE VISIT  NOTE  [facility] CARDIOLOGY      Name: Angela Tovar    Date: 2023  MRN:  [id number]  :  1950      REFERRING/PRIMARY PROVIDER:  Ediwna Sibley MD    Chief Complaint   Patient presents with    Atrial Fibrillation       HPI: Angela Tovar is a 73 y.o. female who presents today for follow-up for chest pain.  Chest pain described as substernal, pressure, with exertion such as going [place] in her neighborhood..  She also has a history of PAF status post ablation by Dr. Albarran and pacemaker followed by EP.  [facility] 10/2021 at [facility], normal circumflex 20-30% circumflex and LAD disease.  Recommend to start atorvastatin after that but she declined would like to work on lifestyle.  Also had a coronary CTA to ensure no on intra arterial course of circumflex which it did.  No chest pain or pressure recently.  Does not exercise routinely but tries to get some walking and then pool exercises when she can.    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     Head ache     Hypertension 2016    Hypertriglyceridemia 2016    Migraine     Mobitz type II atrioventricular block     Osteopenia     Paroxysmal atrial fibrillation 2016    PONV (postoperative nausea and vomiting)     Snoring 2016    Wears eyeglasses        Past Surgical History:   Procedure Laterality Date    ABLATION OF DYSRHYTHMIC FOCUS  3/2016    1st ablation  I think.    CARDIAC ABLATION      X 2    CARDIAC CATHETERIZATION  a few years    CARDIAC CATHETERIZATION Left 10/12/2021    Procedure: Left Heart Cath **hold eliquis 48-hours**;  Surgeon: Johnny Rodriguez MD;  Location:   [facility];  Service: Cardiovascular;  Laterality: Left;  **hold eliquis 48-hours**    CARDIAC ELECTROPHYSIOLOGY PROCEDURE N/A 1/12/2017    Procedure: Pacemaker DC new;  Surgeon: Jhon Vivas DO;  Location:  [facility] EP INVASIVE LOCATION;  Service:     COLONOSCOPY      3 years ago    D & C HYSTEROSCOPY      HYSTERECTOMY      Hysterectomy at the age of 30.     INSERT / REPLACE / REMOVE PACEMAKER  1/12/2017    LIPOMA RESECTION Right     Right leg lipoma resection x7.     NISSEN FUNDOPLICATION       Nissen fundoplication x2, with resultant esophageal narrowing.      OTHER SURGICAL HISTORY      Electrophysiology study by Dr. Mayen. 01/20/2005, showing evidence of focal epicardial, atrial fibrillation emanating from the common pulmonary veins and probably of 1 of the tributaries. Subsequently, ablation procedure was performed, but was unsuccessful according to the patient.  Complication with procedure, resulting in cardiac tamponade.  Patient is status post pericardiocentesis. Sta     PERICARDIOCENTESIS         Social History     Socioeconomic History    Marital status:    Tobacco Use    Smoking status: Never    Smokeless tobacco: Never   Vaping Use    Vaping Use: Never used   Substance and Sexual Activity    Alcohol use: No    Drug use: No    Sexual activity: Defer       Family History   Problem Relation Age of Onset    Heart attack Mother         ROS:   Constitutional no fever,  no weight loss   Skin no rash, no subcutaneous nodules   Otolaryngeal no difficulty swallowing   Cardiovascular See HPI   Pulmonary no cough, no sputum production   Gastrointestinal no constipation, no diarrhea   Genitourinary no dysuria, no hematuria   Hematologic no easy bruisability, no abnormal bleeding   Musculoskeletal no muscle pain   Neurologic no dizziness, no falls         Allergies   Allergen Reactions    Dilaudid [Hydromorphone Hcl] Hallucinations    Erythromycin Other (See Comments)     Nausea or  "rash,  uncertain    Oxycodone-Acetaminophen Other (See Comments)     hallucinations    Percocet [Oxycodone-Acetaminophen] Hallucinations    Codeine Nausea And Vomiting    Sulfa Antibiotics Rash         Current Outpatient Medications:     APPLE CIDER VINEGAR PO, Take  by mouth Daily., Disp: , Rfl:     Ascorbic Acid (VITAMIN C PO), Take 1 tablet by mouth Daily., Disp: , Rfl:     CALCIUM PO, Take 500 mg by mouth Daily., Disp: , Rfl:     Cholecalciferol (VITAMIN D3 PO), Take 1 tablet by mouth Daily. 2000mg, Disp: , Rfl:     CINNAMON PO, Take  by mouth Daily., Disp: , Rfl:     Cyanocobalamin (VITAMIN B-12 PO), Take 1 tablet by mouth Daily. 2500mcg, Disp: , Rfl:     Eliquis 5 MG tablet tablet, TAKE 1 TABLET EVERY 12 HOURS, Disp: 180 tablet, Rfl: 3    esomeprazole (nexIUM) 40 MG capsule, TAKE 1 CAPSULE TWICE DAILY, Disp: 180 capsule, Rfl: 3    ibuprofen (ADVIL,MOTRIN) 200 MG tablet, Take 1 tablet by mouth Every 6 (Six) Hours As Needed for Mild Pain., Disp: , Rfl:     Jardiance 25 MG tablet tablet, Take 1 tablet by mouth Daily., Disp: , Rfl:     vitamin C (ASCORBIC ACID) 250 MG tablet, Take 1 tablet by mouth Daily., Disp: , Rfl:     Vitals:    08/07/23 1045   BP: 138/76   BP Location: Left arm   Patient Position: Sitting   Pulse: 83   SpO2: 94%   Weight: 79.8 kg (176 lb)   Height: 163.8 cm (64.5\")     Body mass index is 29.74 kg/mý.    PHYSICAL EXAM:    General Appearance:   well developed  well nourished  HENT:   oropharynx moist  lips not cyanotic  Neck:  thyroid not enlarged  supple  Respiratory:  no respiratory distress  normal breath sounds  no rales  Cardiovascular:  no jugular venous distention  regular rhythm  apical impulse normal  S1 normal, S2 normal  no S3, no S4   no murmur  no rub, no thrill  carotid pulses normal; no bruit  pedal pulses normal  lower extremity edema: none    Gastrointestinal:   bowel sounds normal  non-tender  no hepatomegaly, no splenomegaly  Musculoskeletal:  no clubbing of fingers. "   normocephalic, head atraumatic  Skin:   warm, dry  Psychiatric:  judgement and insight appropriate  normal mood and affect    RESULTS:   Procedures    Results for orders placed during the hospital encounter of 10/12/21    Adult Transthoracic Echo Complete W/ Cont if Necessary Per Protocol    Interpretation Summary  ú Left ventricular ejection fraction appears to be 61 - 65%. Left ventricular systolic function is normal.  ú Estimated right ventricular systolic pressure from tricuspid regurgitation is normal (<35 mmHg).  ú No significant structural or functional valvular disease.        Labs:  Lab Results   Component Value Date    CHOL 159 10/12/2021    TRIG 125 10/12/2021    HDL 40 10/12/2021    LDL 97 10/12/2021    AST 16 10/05/2021    ALT 19 10/05/2021     Lab Results   Component Value Date    HGBA1C 6.80 (H) 10/12/2021     No components found for: CREATINININE  eGFR Non  Amer   Date Value Ref Range Status   10/05/2021 60 (L) >60 mL/min/1.73 Final   01/10/2017 72 >60 mL/min/1.73 Final       Most recent PCP note, imaging tests, and labs reviewed.  I personally reviewed her cath films and coronary CTA films, interpreted, left circumflex arises from right coronary cusp, not interarterial course.    ASSESSMENT:  Problem List Items Addressed This Visit          Cardiac and Vasculature    Paroxysmal atrial fibrillation - Primary    Overview     Onset of atrial fibrillation in 2004.   Underwent trial of multiple medications, which were discontinued due to intolerance or unresponsiveness.   Electrophysiology study by Dr. Mayen. 01/20/2005, showing evidence of focal epicardial, atrial fibrillation emanating from the common pulmonary veins and probably of 1 of the tributaries. Subsequently, ablation procedure was performed, but was unsuccessful according to the patient.  Complication with procedure, resulting in cardiac tamponade.  Patient is status post pericardiocentesis.    Recurrence of bouts of  "paroxysmal atrial fibrillation recently with good response to Cardizem.   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 There was transient complete heart block with normalization of conduction at the time of the ablation.  Patient currently now in normal sinus rhythm with completely normalization of conduction with IL interval at 130 ms.              SVT (supraventricular tachycardia)    Overview     Status post AVNRT, common type ablation on 03/24/2016, with transient complete heart block, now completely normalized conduction.     Frequent short episodes of SVT presenting with a \"weak spells.\"          Chest pain    Mobitz type II atrioventricular block    Pacemaker    Overview     [facility]            PLAN:    1.  Chest pain and shortness of breath with exertion:  [facility] 10/21/21, no significant stenosis of the native coronaries.   Coronary CTA 11/18/21 confirmed circumflex arising from right coronary cusp, does not course between the aorta and pulmonary arteries.    No further chest pain or pressure currently, continue current medical therapy and exercise.    The patient was advised to call 911 if chest pain worsens or persist or rest.    2.  Hyperlipidemia:  She started over-the-counter medicine she is reluctant to start any new prescription medicines, she will have blood work at PCP next week.    3.  PAF:  Hold Eliquis 48 hours prior to cath  Continue follow-up with EP    4.  Third-degree AV block after ablation:  Continue EP follow-up    5.  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      Return to clinic in 12 months, or sooner as needed.    Thank you for the opportunity to share in the care of your patient; please do not hesitate to call me with any questions.     Johnny Rodriguez MD, [facility]  Office: [phone] [address] [address] [facility] " [address]  [place] [address]    08/07/23

## 2023-09-16 PROCEDURE — 93296 REM INTERROG EVL PM/IDS: CPT | Performed by: STUDENT IN AN ORGANIZED HEALTH CARE EDUCATION/TRAINING PROGRAM

## 2023-09-16 PROCEDURE — 93294 REM INTERROG EVL PM/LDLS PM: CPT | Performed by: STUDENT IN AN ORGANIZED HEALTH CARE EDUCATION/TRAINING PROGRAM

## 2023-11-20 RX ORDER — APIXABAN 5 MG/1
TABLET, FILM COATED ORAL
Qty: 180 TABLET | Refills: 4 | Status: SHIPPED | OUTPATIENT
Start: 2023-11-20

## 2024-02-21 NOTE — PROGRESS NOTES
Cardiac Electrophysiology Outpatient Note  [facility]    Office Visit     Angela Tovar  [id number]  02/22/2024    Primary Care Physician: Edwina Sibley MD    Referred By: No ref. provider found    Subjective     Chief Complaint   Patient presents with    Atrial Fibrillation       History of Present Illness:   Angela Tovar is a 73 y.o. female who presents to my electrophysiology clinic for follow up of hypertension and atrial fibrillation status post ablation in 2005 at an outside institution resulting in pericardial effusion with prolonged hospital stay.  She also had SVT status post AVNRT ablation resulting in second-degree AV block, now status post dual-chamber pacemaker.  She was last seen in our office in December 2022.  Since that time, she reports no major changes for her health.  Unfortunately her  was diagnosed with very aggressive AML.  He is currently hospitalized at  after undergoing chemotherapy.    She has not had major symptoms with episodes of atrial fibrillation.  She symptoms feels little more fatigued and shaky.  Is not always aware when she is in atrial fibrillation.  Has been on Mounjaro and has lost about 16 pounds thus far.    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     Head ache     Hypertension 09/20/2016    Hypertriglyceridemia 09/20/2016    Migraine     Mobitz type II atrioventricular block     Osteopenia     Paroxysmal atrial fibrillation 09/20/2016    PONV (postoperative nausea and vomiting)     Snoring 09/20/2016    Wears eyeglasses        Past Surgical History:   Procedure Laterality Date     ABLATION OF DYSRHYTHMIC FOCUS  3/2016    1st ablation 2005 I think.    CARDIAC ABLATION      X 2    CARDIAC CATHETERIZATION  a few years    CARDIAC CATHETERIZATION Left 10/12/2021    Procedure: Left Heart Cath **hold eliquis 48-hours**;  Surgeon: Johnny Rodriguez MD;  Location:  [facility];  Service: Cardiovascular;  Laterality: Left;  **hold eliquis 48-hours**    CARDIAC ELECTROPHYSIOLOGY PROCEDURE N/A 1/12/2017    Procedure: Pacemaker DC new;  Surgeon: Jhon Vivas DO;  Location:  [facility];  Service:     COLONOSCOPY      3 years ago    D & C HYSTEROSCOPY      HYSTERECTOMY      Hysterectomy at the age of 30.     INSERT / REPLACE / REMOVE PACEMAKER  1/12/2017    LIPOMA RESECTION Right     Right leg lipoma resection x7.     NISSEN FUNDOPLICATION       Nissen fundoplication x2, with resultant esophageal narrowing.      OTHER SURGICAL HISTORY      Electrophysiology study by Dr. Mayen. 01/20/2005, showing evidence of focal epicardial, atrial fibrillation emanating from the common pulmonary veins and probably of 1 of the tributaries. Subsequently, ablation procedure was performed, but was unsuccessful according to the patient.  Complication with procedure, resulting in cardiac tamponade.  Patient is status post pericardiocentesis. Sta     PERICARDIOCENTESIS         Family History   Problem Relation Age of Onset    Heart attack Mother        Social History     Socioeconomic History    Marital status:    Tobacco Use    Smoking status: Never    Smokeless tobacco: Never   Vaping Use    Vaping Use: Never used   Substance and Sexual Activity    Alcohol use: No    Drug use: No    Sexual activity: Defer         Current Outpatient Medications:     apixaban (Eliquis) 5 MG tablet tablet, TAKE 1 TABLET EVERY 12 HOURS, Disp: 180 tablet, Rfl: 4    CALCIUM PO, Take 500 mg by mouth Daily., Disp: , Rfl:     Cholecalciferol (VITAMIN D3 PO), Take 1 tablet by mouth Daily. 2000mg, Disp: ,  "Rfl:     Cyanocobalamin (VITAMIN B-12 PO), Take 1 tablet by mouth Daily. 2500mcg, Disp: , Rfl:     esomeprazole (nexIUM) 40 MG capsule, TAKE 1 CAPSULE TWICE DAILY, Disp: 180 capsule, Rfl: 3    ibuprofen (ADVIL,MOTRIN) 200 MG tablet, Take 1 tablet by mouth Every 6 (Six) Hours As Needed for Mild Pain., Disp: , Rfl:     Jardiance 25 MG tablet tablet, Take 1 tablet by mouth Daily., Disp: , Rfl:     Mounjaro 2.5 MG/0.5ML solution pen-injector pen, Inject 0.5 mL under the skin into the appropriate area as directed 1 (One) Time Per Week., Disp: , Rfl:     vitamin C (ASCORBIC ACID) 250 MG tablet, Take 1 tablet by mouth Daily., Disp: , Rfl:     Allergies:   Allergies   Allergen Reactions    Dilaudid [Hydromorphone Hcl] Hallucinations    Erythromycin Other (See Comments)     Nausea or rash,  uncertain    Oxycodone-Acetaminophen Other (See Comments)     hallucinations    Percocet [Oxycodone-Acetaminophen] Hallucinations    Codeine Nausea And Vomiting    Sulfa Antibiotics Rash       Objective   Vital Signs: Blood pressure 114/70, pulse 85, height 162.6 cm (64\"), weight 72.1 kg (159 lb), SpO2 98%.    PHYSICAL EXAM  General appearance: Awake, alert, cooperative  Head: Normocephalic, without obvious abnormality, atraumatic  Neck: No JVD  Lungs: Clear to ascultation bilaterally  Heart: Regular rate and rhythm, no murmurs, 2+ LE pulses, no lower extremity swelling  Skin: Skin color, turgor normal, no rashes or lesions  Neurologic: Grossly normal     Lab Results   Component Value Date    GLUCOSE 120 (H) 10/05/2021    CALCIUM 9.3 10/05/2021     10/05/2021    K 4.5 10/05/2021    CO2 25.0 10/05/2021     10/05/2021    BUN 18 10/05/2021    CREATININE 0.80 11/18/2021    EGFRIFNONA 60 (L) 10/05/2021    BCR 19.6 10/05/2021    ANIONGAP 11.0 10/05/2021     Lab Results   Component Value Date    WBC 6.02 10/05/2021    HGB 14.4 10/05/2021    HCT 44.9 10/05/2021    MCV 88.4 10/05/2021     (L) 10/05/2021     No results found for: " "\"INR\", \"PROTIME\"  No results found for: \"TSH\", \"K8ZMSLS\", \"D7TAAQQ\", \"THYROIDAB\"       Results for orders placed during the hospital encounter of 10/12/21    Adult Transthoracic Echo Complete W/ Cont if Necessary Per Protocol    Interpretation Summary  · Left ventricular ejection fraction appears to be 61 - 65%. Left ventricular systolic function is normal.  · Estimated right ventricular systolic pressure from tricuspid regurgitation is normal (<35 mmHg).  · No significant structural or functional valvular disease.         I personally viewed and interpreted the patient's EKG/Telemetry/lab data    Procedures    Angela Tovar  reports that she has never smoked. She has never used smokeless tobacco.. g the patient.           Advance Care Planning   Advance Care Planning: ACP discussion was held with the patient during this visit. Patient does not have an advance directive, information provided.     Assessment & Plan    1. Paroxysmal atrial fibrillation (HCC)  She has a history of paroxysmal atrial fibrillation.  [place] of atrial fibrillation on her device is relatively stable to about 4%.  Longest episode appears to be 6 hours.  She is not aware of these episodes.  Remains on Eliquis for anticoagulation.    We a long discussion about options going forward for this.  This included an option of continued monitoring, antiarrhythmic therapy, or catheter ablation.  I think she would actually be a good candidate for an ablation, however she is very hesitant to do this.  This is very understandable given her prior complications with ablation in the past.  For now she elected to continue monitoring.  Could consider adding an antiarrhythmic should episodes become more of an issue for her.      2. Pacemaker  [employer] dual-chamber pacemaker was interrogated and is functioning normally.  She has approximately 1.5 years of battery life remaining.  She is pacing 2% time in the atrium 29% of time in the ventricle.  " Her pacing and sensing thresholds are all within normal limits.  Most of her ventricular pacing seems to be when she is in atrial fibrillation.  We did adjust some of her parameters today in order to try to minimize ventricular pacing when not necessary.  We decreased her mode switch rate from 70-60 and her atrial trigger rate from 170-1 50.    3. Mobitz type II atrioventricular block  She has intermittent AV block which is well treated by her pacemaker as above.     4. Primary hypertension  Blood pressure was elevated today.  We discussed the importance of home blood pressure monitoring going forward.     5. SVT (supraventricular tachycardia) (HCC)  SVT has been well treated by her prior ablation she has not had any recurrence of this.  We will continue to monitor.    Follow Up:  Return in about 1 year (around 2/22/2025) for Recheck, BSC.      Thank you for allowing me to participate in the care of your patient. Please do not hesitate to contact me with additional questions or concerns.      Sukhjinder Boyd M.D.  Cardiac Electrophysiologist  [facility]

## 2024-02-22 ENCOUNTER — OFFICE VISIT (OUTPATIENT)
Dept: CARDIOLOGY | Facility: CLINIC | Age: 74
End: 2024-02-22
Payer: MEDICARE

## 2024-02-22 VITALS
SYSTOLIC BLOOD PRESSURE: 114 MMHG | HEART RATE: 85 BPM | HEIGHT: 64 IN | OXYGEN SATURATION: 98 % | BODY MASS INDEX: 27.14 KG/M2 | WEIGHT: 159 LBS | DIASTOLIC BLOOD PRESSURE: 70 MMHG

## 2024-02-22 DIAGNOSIS — I44.1 MOBITZ TYPE II ATRIOVENTRICULAR BLOCK: ICD-10-CM

## 2024-02-22 DIAGNOSIS — I48.0 PAROXYSMAL ATRIAL FIBRILLATION: ICD-10-CM

## 2024-02-22 DIAGNOSIS — I10 PRIMARY HYPERTENSION: ICD-10-CM

## 2024-02-22 DIAGNOSIS — Z95.0 PACEMAKER: Primary | ICD-10-CM

## 2024-02-22 RX ORDER — TIRZEPATIDE 2.5 MG/.5ML
2.5 INJECTION, SOLUTION SUBCUTANEOUS WEEKLY
COMMUNITY
Start: 2023-09-10

## 2024-03-21 ENCOUNTER — TELEPHONE (OUTPATIENT)
Dept: CARDIOLOGY | Facility: CLINIC | Age: 74
End: 2024-03-21
Payer: MEDICARE

## 2024-03-21 NOTE — TELEPHONE ENCOUNTER
I left my name and number for a return call regarding her latitude monitor not sending the scheduled reading.

## 2024-05-01 RX ORDER — ESOMEPRAZOLE MAGNESIUM 40 MG/1
CAPSULE, DELAYED RELEASE ORAL
Qty: 180 CAPSULE | Refills: 3 | Status: SHIPPED | OUTPATIENT
Start: 2024-05-01

## 2024-06-21 ENCOUNTER — TELEPHONE (OUTPATIENT)
Dept: CARDIOLOGY | Facility: CLINIC | Age: 74
End: 2024-06-21
Payer: MEDICARE

## 2024-06-21 NOTE — TELEPHONE ENCOUNTER
Left message letting patient know we didn't receive the scheduled reading from her bedside home monitor. Asked her to send in a manual reading if she knew how to or to give me a call back for assistance.

## 2024-08-26 ENCOUNTER — OFFICE VISIT (OUTPATIENT)
Dept: CARDIOLOGY | Facility: CLINIC | Age: 74
End: 2024-08-26
Payer: MEDICARE

## 2024-08-26 VITALS
SYSTOLIC BLOOD PRESSURE: 110 MMHG | OXYGEN SATURATION: 96 % | WEIGHT: 156.2 LBS | HEART RATE: 62 BPM | HEIGHT: 64 IN | BODY MASS INDEX: 26.67 KG/M2 | DIASTOLIC BLOOD PRESSURE: 70 MMHG

## 2024-08-26 DIAGNOSIS — E78.5 DYSLIPIDEMIA: ICD-10-CM

## 2024-08-26 DIAGNOSIS — Q24.5 CORONARY ARTERY ANOMALY, CONGENITAL: ICD-10-CM

## 2024-08-26 DIAGNOSIS — I44.1 MOBITZ TYPE II ATRIOVENTRICULAR BLOCK: ICD-10-CM

## 2024-08-26 DIAGNOSIS — I10 PRIMARY HYPERTENSION: ICD-10-CM

## 2024-08-26 DIAGNOSIS — I48.0 PAROXYSMAL ATRIAL FIBRILLATION: Primary | ICD-10-CM

## 2024-08-26 DIAGNOSIS — Z95.0 PACEMAKER: ICD-10-CM

## 2024-08-26 PROCEDURE — 3078F DIAST BP <80 MM HG: CPT | Performed by: INTERNAL MEDICINE

## 2024-08-26 PROCEDURE — 93000 ELECTROCARDIOGRAM COMPLETE: CPT | Performed by: INTERNAL MEDICINE

## 2024-08-26 PROCEDURE — 99214 OFFICE O/P EST MOD 30 MIN: CPT | Performed by: INTERNAL MEDICINE

## 2024-08-26 PROCEDURE — 3074F SYST BP LT 130 MM HG: CPT | Performed by: INTERNAL MEDICINE

## 2024-08-26 NOTE — PROGRESS NOTES
OFFICE VISIT  NOTE  [facility] CARDIOLOGY      Name: Angela Tovar    Date: 2024  MRN:  [id number]  :  1950      REFERRING/PRIMARY PROVIDER:  Edwina Sibley MD    Chief Complaint   Patient presents with    Paroxysmal atrial fibrillation       HPI: Angela Tovar is a 74 y.o. female who presents for chest pain.  Minor nonobstructive CAD. Cath .  She also has a history of PAF status post ablation by Dr. Albarran and pacemaker followed by EP.  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  Recommend to start atorvastatin after that but she declined would like to work on lifestyle and she takes an over-the-counter remedy..  Also had a coronary CTA to ensure no on intra arterial course of circumflex which it did.  Unfortunately, she is dealing with quite a bit of grief after losing her  to myeloma, 2023.      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     Head ache     Hypertension 2016    Hypertriglyceridemia 2016    Migraine     Mobitz type II atrioventricular block     Osteopenia     Paroxysmal atrial fibrillation 2016    PONV (postoperative nausea and vomiting)     Snoring 2016    Wears eyeglasses        Past Surgical History:   Procedure Laterality Date    ABLATION OF DYSRHYTHMIC FOCUS  3/2016    1st ablation  I think.    CARDIAC ABLATION      X 2    CARDIAC CATHETERIZATION  a few years    CARDIAC CATHETERIZATION Left 10/12/2021    Procedure: Left Heart Cath **hold eliquis 48-hours**;  Surgeon: Johnny Rodriguez MD;  Location: [facility]  CATH INVASIVE LOCATION;  Service: Cardiovascular;  Laterality: Left;  **hold eliquis 48-hours**    CARDIAC ELECTROPHYSIOLOGY PROCEDURE N/A 1/12/2017    Procedure: Pacemaker DC new;  Surgeon: Jhon Vivas DO;  Location: [facility] INVASIVE LOCATION;  Service:     COLONOSCOPY      3 years ago    D & C HYSTEROSCOPY      HYSTERECTOMY      Hysterectomy at the age of 30.     INSERT / REPLACE / REMOVE PACEMAKER  1/12/2017    LIPOMA RESECTION Right     Right leg lipoma resection x7.     NISSEN FUNDOPLICATION       Nissen fundoplication x2, with resultant esophageal narrowing.      OTHER SURGICAL HISTORY      Electrophysiology study by Dr. Mayen. 01/20/2005, showing evidence of focal epicardial, atrial fibrillation emanating from the common pulmonary veins and probably of 1 of the tributaries. Subsequently, ablation procedure was performed, but was unsuccessful according to the patient.  Complication with procedure, resulting in cardiac tamponade.  Patient is status post pericardiocentesis. Sta     PERICARDIOCENTESIS         Social History     Socioeconomic History    Marital status:    Tobacco Use    Smoking status: Never    Smokeless tobacco: Never   Vaping Use    Vaping status: Never Used   Substance and Sexual Activity    Alcohol use: No    Drug use: No    Sexual activity: Defer       Family History   Problem Relation Age of Onset    Heart attack Mother         ROS:   Constitutional no fever,  no weight loss   Skin no rash, no subcutaneous nodules   Otolaryngeal no difficulty swallowing   Cardiovascular See HPI   Pulmonary no cough, no sputum production   Gastrointestinal no constipation, no diarrhea   Genitourinary no dysuria, no hematuria   Hematologic no easy bruisability, no abnormal bleeding   Musculoskeletal no muscle pain   Neurologic no dizziness, no falls         Allergies   Allergen Reactions    Dilaudid [Hydromorphone Hcl] Hallucinations    Erythromycin Other (See Comments)     Nausea or rash,   "uncertain    Oxycodone-Acetaminophen Other (See Comments)     hallucinations    Percocet [Oxycodone-Acetaminophen] Hallucinations    Codeine Nausea And Vomiting    Sulfa Antibiotics Rash         Current Outpatient Medications:     apixaban (Eliquis) 5 MG tablet tablet, TAKE 1 TABLET EVERY 12 HOURS, Disp: 180 tablet, Rfl: 4    CALCIUM PO, Take 500 mg by mouth Daily., Disp: , Rfl:     Cholecalciferol (VITAMIN D3 PO), Take 1 tablet by mouth Daily. 2000mg, Disp: , Rfl:     Cyanocobalamin (VITAMIN B-12 PO), Take 1 tablet by mouth Daily. 2500mcg, Disp: , Rfl:     esomeprazole (nexIUM) 40 MG capsule, TAKE 1 CAPSULE TWICE DAILY, Disp: 180 capsule, Rfl: 3    ibuprofen (ADVIL,MOTRIN) 200 MG tablet, Take 1 tablet by mouth Every 6 (Six) Hours As Needed for Mild Pain., Disp: , Rfl:     Jardiance 25 MG tablet tablet, Take 1 tablet by mouth Daily., Disp: , Rfl:     Mounjaro 2.5 MG/0.5ML solution pen-injector pen, Inject 0.5 mL under the skin into the appropriate area as directed 1 (One) Time Per Week., Disp: , Rfl:     vitamin C (ASCORBIC ACID) 250 MG tablet, Take 1 tablet by mouth Daily., Disp: , Rfl:     Vitals:    08/26/24 1420   BP: 110/70   Pulse: 62   SpO2: 96%   Weight: 70.9 kg (156 lb 3.2 oz)   Height: 162.6 cm (64\")       Body mass index is 26.81 kg/m².    PHYSICAL EXAM:    General Appearance:   well developed  well nourished  HENT:   oropharynx moist  lips not cyanotic  Neck:  thyroid not enlarged  supple  Respiratory:  no respiratory distress  normal breath sounds  no rales  Cardiovascular:  no jugular venous distention  regular rhythm  apical impulse normal  S1 normal, S2 normal  no S3, no S4   no murmur  no rub, no thrill  carotid pulses normal; no bruit  pedal pulses normal  lower extremity edema: none    Gastrointestinal:   bowel sounds normal  non-tender  no hepatomegaly, no splenomegaly  Musculoskeletal:  no clubbing of fingers.   normocephalic, head atraumatic  Skin:   warm, dry  Psychiatric:  judgement and " "insight appropriate  normal mood and affect    RESULTS:     ECG 12 Lead    Date/Time: 8/26/2024 2:43 PM  Performed by: Johnny Rodriguez MD    Authorized by: Johnny Rodriguez MD  Comparison: compared with previous ECG from 10/16/2021  Similar to previous ECG  Rhythm: sinus rhythm  Rate: normal  BPM: 70  QRS axis: normal    Clinical impression: normal ECG          Results for orders placed during the hospital encounter of 10/12/21    Adult Transthoracic Echo Complete W/ Cont if Necessary Per Protocol    Interpretation Summary  · Left ventricular ejection fraction appears to be 61 - 65%. Left ventricular systolic function is normal.  · Estimated right ventricular systolic pressure from tricuspid regurgitation is normal (<35 mmHg).  · No significant structural or functional valvular disease.        Labs:  Lab Results   Component Value Date    CHOL 159 10/12/2021    TRIG 125 10/12/2021    HDL 40 10/12/2021    LDL 97 10/12/2021    AST 16 10/05/2021    ALT 19 10/05/2021     Lab Results   Component Value Date    HGBA1C 6.80 (H) 10/12/2021     No components found for: \"CREATINININE\"  eGFR Non  Amer   Date Value Ref Range Status   10/05/2021 60 (L) >60 mL/min/1.73 Final   01/10/2017 72 >60 mL/min/1.73 Final       Most recent PCP note, imaging tests, and labs reviewed.    ASSESSMENT:  Problem List Items Addressed This Visit       Paroxysmal atrial fibrillation - Primary (Chronic)    Overview     Onset of atrial fibrillation in 2004.   Underwent trial of multiple medications, which were discontinued due to intolerance or unresponsiveness.   Electrophysiology study by Dr. Mayen. 01/20/2005, showing evidence of focal epicardial, atrial fibrillation emanating from the common pulmonary veins and probably of 1 of the tributaries. Subsequently, ablation procedure was performed, but was unsuccessful according to the patient.  Complication with procedure, resulting in cardiac tamponade.  Patient is status post " pericardiocentesis.    Recurrence of bouts of paroxysmal atrial fibrillation recently with good response to Cardizem.   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 There was transient complete heart block with normalization of conduction at the time of the ablation.  Patient currently now in normal sinus rhythm with completely normalization of conduction with LA interval at 130 ms.              Primary hypertension (Chronic)    Dyslipidemia (Chronic)    Mobitz type II atrioventricular block    Pacemaker    Overview     C         Coronary artery anomaly, congenital    Overview     11/18/21 Coronary CTA: Calcium score calculated at 380.4  [facility] 10/12/2021: Left circumflex arising from the right coronary cusp, with proximal 30-40% stenosis at an acute.  CTA ordered.  No significant stenosis of the native coronaries.            PLAN:    1.  Moderate nonobstructive CAD with anomalous left circumflex  [facility] 10/21/21, no significant stenosis of the native coronaries.   Coronary CTA 11/18/21 confirmed circumflex arising from right coronary cusp, does not course between the aorta and pulmonary arteries.    Healthy lifestyle measures recommended with cardiac diet and aerobic exercise  Patient declined statins takes an over-the-counter medicine for that.    No further chest pain or pressure currently, continue current medical therapy and exercise.    The patient was advised to call 911 if chest pain worsens or persist or rest.    2.  Hyperlipidemia:  Patient did not want to start statin, she takes an over-the-counter medication she will have lipids checked at PCP soon      3.  PAF:  On Eliquis for stroke prevention  Continue follow-up with EP  She has occasional palpitations, and 2% A-fib on device interrogation.  She is on Eliquis for stroke prevention.    4.  Third-degree AV block after ablation:  Continue EP follow-up    5.  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      Return to clinic in 12 months, or sooner as needed.    Thank you for the opportunity to share in the care of your patient; please do not hesitate to call me with any questions.     Johnny Rodriguez MD, [facility]  Office: [phone] [address] [place] [address]    08/26/24

## 2024-09-18 PROCEDURE — 93294 REM INTERROG EVL PM/LDLS PM: CPT | Performed by: STUDENT IN AN ORGANIZED HEALTH CARE EDUCATION/TRAINING PROGRAM

## 2024-09-18 PROCEDURE — 93296 REM INTERROG EVL PM/IDS: CPT | Performed by: STUDENT IN AN ORGANIZED HEALTH CARE EDUCATION/TRAINING PROGRAM

## 2025-01-30 RX ORDER — APIXABAN 5 MG/1
TABLET, FILM COATED ORAL
Qty: 180 TABLET | Refills: 3 | Status: SHIPPED | OUTPATIENT
Start: 2025-01-30

## 2025-02-27 ENCOUNTER — OFFICE VISIT (OUTPATIENT)
Dept: CARDIOLOGY | Facility: CLINIC | Age: 75
End: 2025-02-27
Payer: MEDICARE

## 2025-02-27 VITALS
SYSTOLIC BLOOD PRESSURE: 114 MMHG | HEIGHT: 65 IN | DIASTOLIC BLOOD PRESSURE: 70 MMHG | BODY MASS INDEX: 26.33 KG/M2 | WEIGHT: 158 LBS | HEART RATE: 81 BPM

## 2025-02-27 DIAGNOSIS — I48.0 PAROXYSMAL ATRIAL FIBRILLATION: Primary | Chronic | ICD-10-CM

## 2025-02-27 DIAGNOSIS — I10 PRIMARY HYPERTENSION: ICD-10-CM

## 2025-02-27 DIAGNOSIS — Z95.0 PACEMAKER: ICD-10-CM

## 2025-02-27 PROCEDURE — 99214 OFFICE O/P EST MOD 30 MIN: CPT | Performed by: STUDENT IN AN ORGANIZED HEALTH CARE EDUCATION/TRAINING PROGRAM

## 2025-02-27 PROCEDURE — 93280 PM DEVICE PROGR EVAL DUAL: CPT | Performed by: STUDENT IN AN ORGANIZED HEALTH CARE EDUCATION/TRAINING PROGRAM

## 2025-02-27 PROCEDURE — 3074F SYST BP LT 130 MM HG: CPT | Performed by: STUDENT IN AN ORGANIZED HEALTH CARE EDUCATION/TRAINING PROGRAM

## 2025-02-27 PROCEDURE — 3078F DIAST BP <80 MM HG: CPT | Performed by: STUDENT IN AN ORGANIZED HEALTH CARE EDUCATION/TRAINING PROGRAM

## 2025-03-03 NOTE — PROGRESS NOTES
Cardiac Electrophysiology Outpatient Note  [facility]    Office Visit     Angela Tovar  [id number]  02/22/2024    Primary Care Physician: Edwina Sibley MD    Referred By: No ref. provider found    Subjective     Chief Complaint   Patient presents with    Paroxysmal atrial fibrillation       History of Present Illness:   Angela Tovar is a 74 y.o. female who presents to my electrophysiology clinic for follow up of hypertension and atrial fibrillation status post ablation in 2005 at an outside institution resulting in pericardial effusion with prolonged hospital stay.  She also had SVT status post AVNRT ablation resulting in second-degree AV block, now status post dual-chamber pacemaker.     Since her last visit she has overall done okay.  Her  did pass away due to AML.  She is overall doing okay and has a good amount of support.  She was admitted with sepsis in November.  She was found to have E. coli sepsis.  Did recover from that.  Overall day-to-day does not have any major complaints.  Does have occasional dizziness.  No chest pain or shortness of breath.  No problems with pacemaker site.    She has not had major symptoms with episodes of atrial fibrillation.  She symptoms feels little more fatigued and shaky.  Is not always aware when she is in atrial fibrillation.  Has been on Mounjaro and has lost about 16 pounds thus far.    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     Head ache     Hypertension 09/20/2016    Hypertriglyceridemia 09/20/2016    Migraine     Mobitz type II atrioventricular block     Osteopenia      Paroxysmal atrial fibrillation 09/20/2016    PONV (postoperative nausea and vomiting)     Snoring 09/20/2016    Wears eyeglasses        Past Surgical History:   Procedure Laterality Date    ABLATION OF DYSRHYTHMIC FOCUS  3/2016    1st ablation 2005 I think.    CARDIAC ABLATION      X 2    CARDIAC CATHETERIZATION  a few years    CARDIAC CATHETERIZATION Left 10/12/2021    Procedure: Left Heart Cath **hold eliquis 48-hours**;  Surgeon: Johnny Rodriguez MD;  Location:  [facility] CATH INVASIVE LOCATION;  Service: Cardiovascular;  Laterality: Left;  **hold eliquis 48-hours**    CARDIAC ELECTROPHYSIOLOGY PROCEDURE N/A 1/12/2017    Procedure: Pacemaker DC new;  Surgeon: Jhon Vivas DO;  Location:  [facility] INVASIVE LOCATION;  Service:     COLONOSCOPY      3 years ago    D & C HYSTEROSCOPY      HYSTERECTOMY      Hysterectomy at the age of 30.     INSERT / REPLACE / REMOVE PACEMAKER  1/12/2017    LIPOMA RESECTION Right     Right leg lipoma resection x7.     NISSEN FUNDOPLICATION       Nissen fundoplication x2, with resultant esophageal narrowing.      OTHER SURGICAL HISTORY      Electrophysiology study by Dr. Mayen. 01/20/2005, showing evidence of focal epicardial, atrial fibrillation emanating from the common pulmonary veins and probably of 1 of the tributaries. Subsequently, ablation procedure was performed, but was unsuccessful according to the patient.  Complication with procedure, resulting in cardiac tamponade.  Patient is status post pericardiocentesis. Sta     PERICARDIOCENTESIS         Family History   Problem Relation Age of Onset    Heart attack Mother        Social History     Socioeconomic History    Marital status:    Tobacco Use    Smoking status: Never    Smokeless tobacco: Never   Vaping Use    Vaping status: Never Used   Substance and Sexual Activity    Alcohol use: No    Drug use: No    Sexual activity: Defer         Current Outpatient Medications:     CALCIUM PO, Take 500 mg by mouth Daily.,  "Disp: , Rfl:     Cholecalciferol (VITAMIN D3 PO), Take 1 tablet by mouth Daily. 2000mg, Disp: , Rfl:     CRANBERRY PO, Take 5 tablets by mouth Daily. 5 gummies daily, Disp: , Rfl:     Cyanocobalamin (VITAMIN B-12 PO), Take 1 tablet by mouth Daily. 2500mcg, Disp: , Rfl:     Eliquis 5 MG tablet tablet, TAKE 1 TABLET EVERY 12 HOURS, Disp: 180 tablet, Rfl: 3    esomeprazole (nexIUM) 40 MG capsule, TAKE 1 CAPSULE TWICE DAILY, Disp: 180 capsule, Rfl: 3    ibuprofen (ADVIL,MOTRIN) 200 MG tablet, Take 1 tablet by mouth Every 6 (Six) Hours As Needed for Mild Pain., Disp: , Rfl:     Mounjaro 2.5 MG/0.5ML solution pen-injector pen, Inject 0.5 mL under the skin into the appropriate area as directed 1 (One) Time Per Week., Disp: , Rfl:     Probiotic Product (PROBIOTIC PO), Take 1 tablet by mouth Daily., Disp: , Rfl:     vitamin C (ASCORBIC ACID) 250 MG tablet, Take 1 tablet by mouth Daily., Disp: , Rfl:     Allergies:   Allergies   Allergen Reactions    Dilaudid [Hydromorphone Hcl] Hallucinations    Erythromycin Other (See Comments)     Nausea or rash,  uncertain    Oxycodone-Acetaminophen Other (See Comments)     hallucinations    Percocet [Oxycodone-Acetaminophen] Hallucinations    Codeine Nausea And Vomiting    Sulfa Antibiotics Rash       Objective   Vital Signs: Blood pressure 114/70, pulse 81, height 163.8 cm (64.5\"), weight 71.7 kg (158 lb).    PHYSICAL EXAM  General appearance: Awake, alert, cooperative  Head: Normocephalic, without obvious abnormality, atraumatic  Neck: No JVD  Lungs: Clear to ascultation bilaterally  Heart: Regular rate and rhythm, no murmurs, 2+ LE pulses, no lower extremity swelling  Skin: Skin color, turgor normal, no rashes or lesions  Neurologic: Grossly normal     Lab Results   Component Value Date    GLUCOSE 120 (H) 10/05/2021    CALCIUM 9.3 10/05/2021     10/05/2021    K 4.5 10/05/2021    CO2 25.0 10/05/2021     10/05/2021    BUN 18 10/05/2021    CREATININE 0.80 11/18/2021    " "EGFRIFNONA 60 (L) 10/05/2021    BCR 19.6 10/05/2021    ANIONGAP 11.0 10/05/2021     Lab Results   Component Value Date    WBC 6.02 10/05/2021    HGB 14.4 10/05/2021    HCT 44.9 10/05/2021    MCV 88.4 10/05/2021     (L) 10/05/2021     No results found for: \"INR\", \"PROTIME\"  No results found for: \"TSH\", \"T4EOPIQ\", \"V4VNPOV\", \"THYROIDAB\"       Results for orders placed during the hospital encounter of 10/12/21    Adult Transthoracic Echo Complete W/ Cont if Necessary Per Protocol    Interpretation Summary  · Left ventricular ejection fraction appears to be 61 - 65%. Left ventricular systolic function is normal.  · Estimated right ventricular systolic pressure from tricuspid regurgitation is normal (<35 mmHg).  · No significant structural or functional valvular disease.         I personally viewed and interpreted the patient's EKG/Telemetry/lab data    Procedures    Angela Stanleyorkle Guy  reports that she has never smoked. She has never used smokeless tobacco.. g the patient.           Advance Care Planning   Advance Care Planning: ACP discussion was held with the patient during this visit. Patient does not have an advance directive, information provided.     Assessment & Plan    1. Paroxysmal atrial fibrillation (HCC)  She has a history of paroxysmal atrial fibrillation.  [place] of atrial fibrillation on her device slightly lower at 2%.  More of these events seem to occur while she was hospitalized for sepsis in November.  Overall not too bothered by her burden of atrial fibrillation.  Remains on Eliquis for anticoagulation.    Discussion about options going forward for this.  This included an option of continued monitoring, antiarrhythmic therapy, or catheter ablation.  I think she would actually be a good candidate for an ablation, however she is very hesitant to do this.  This is very understandable given her prior complications with ablation in the past.  For now she elected to continue monitoring.  Could " consider adding an antiarrhythmic should episodes become more of an issue for her.      2. Pacemaker  [employer] dual-chamber pacemaker was interrogated and is functioning normally.  She has approximately 8 months of battery life remaining.  She is pacing 2% time in the atrium 36% of time in the ventricle.  Her pacing and sensing thresholds are all within normal limits.  Will need to continue to monitor her ventricular pacing percentage.  Likely plan for new echo prior to her generator change.    3. Mobitz type II atrioventricular block  She has intermittent AV block which is well treated by her pacemaker as above.  This was possibly secondary to her prior ablation for AV node reentry.     4. Primary hypertension  Blood pressure well-controlled today.  Continue to monitor.      5. SVT (supraventricular tachycardia) (HCC)  SVT has been well treated by her prior ablation she has not had any recurrence of this.  We will continue to monitor.    Follow Up:  Return in about 1 year (around 2/27/2026) for Recheck.      Thank you for allowing me to participate in the care of your patient. Please do not hesitate to contact me with additional questions or concerns.      Sukhjinder Boyd M.D.  Cardiac Electrophysiologist  [facility]

## 2025-05-12 RX ORDER — ESOMEPRAZOLE MAGNESIUM 40 MG/1
40 CAPSULE, DELAYED RELEASE ORAL 2 TIMES DAILY
Qty: 180 CAPSULE | Refills: 3 | Status: SHIPPED | OUTPATIENT
Start: 2025-05-12

## 2025-06-23 LAB
MC_CV_MDC_IDC_RATE_1: 160
MC_CV_MDC_IDC_ZONE_ID: 1
MDC_IDC_MSMT_BATTERY_REMAINING_LONGEVITY: 10 MO
MDC_IDC_MSMT_BATTERY_REMAINING_PERCENTAGE: 15 %
MDC_IDC_MSMT_BATTERY_STATUS: NORMAL
MDC_IDC_MSMT_LEADCHNL_RA_DTM: NORMAL
MDC_IDC_MSMT_LEADCHNL_RA_IMPEDANCE_VALUE: 461
MDC_IDC_MSMT_LEADCHNL_RA_PACING_THRESHOLD_POLARITY: NORMAL
MDC_IDC_MSMT_LEADCHNL_RA_SENSING_INTR_AMPL: 3.7
MDC_IDC_MSMT_LEADCHNL_RV_DTM: NORMAL
MDC_IDC_MSMT_LEADCHNL_RV_IMPEDANCE_VALUE: 1067
MDC_IDC_MSMT_LEADCHNL_RV_PACING_THRESHOLD_POLARITY: NORMAL
MDC_IDC_MSMT_LEADCHNL_RV_SENSING_INTR_AMPL: 17.3
MDC_IDC_PG_IMPLANT_DTM: NORMAL
MDC_IDC_PG_MFG: NORMAL
MDC_IDC_PG_MODEL: NORMAL
MDC_IDC_PG_SERIAL: NORMAL
MDC_IDC_PG_TYPE: NORMAL
MDC_IDC_SESS_DTM: NORMAL
MDC_IDC_SESS_TYPE: NORMAL
MDC_IDC_SET_BRADY_AT_MODE_SWITCH_RATE: 150
MDC_IDC_SET_BRADY_LOWRATE: 60
MDC_IDC_SET_BRADY_MAX_SENSOR_RATE: 130
MDC_IDC_SET_BRADY_MAX_TRACKING_RATE: 130
MDC_IDC_SET_BRADY_MODE: NORMAL
MDC_IDC_SET_BRADY_PAV_DELAY: 220
MDC_IDC_SET_BRADY_SAV_DELAY: 220
MDC_IDC_SET_LEADCHNL_RA_PACING_AMPLITUDE: 2
MDC_IDC_SET_LEADCHNL_RA_PACING_POLARITY: NORMAL
MDC_IDC_SET_LEADCHNL_RA_PACING_PULSEWIDTH: 0.4
MDC_IDC_SET_LEADCHNL_RA_SENSING_POLARITY: NORMAL
MDC_IDC_SET_LEADCHNL_RA_SENSING_SENSITIVITY: 0.2
MDC_IDC_SET_LEADCHNL_RV_PACING_AMPLITUDE: 2
MDC_IDC_SET_LEADCHNL_RV_PACING_POLARITY: NORMAL
MDC_IDC_SET_LEADCHNL_RV_PACING_PULSEWIDTH: 0.4
MDC_IDC_SET_LEADCHNL_RV_SENSING_POLARITY: NORMAL
MDC_IDC_SET_LEADCHNL_RV_SENSING_SENSITIVITY: 0.6
MDC_IDC_SET_ZONE_STATUS: NORMAL
MDC_IDC_SET_ZONE_TYPE: NORMAL
MDC_IDC_STAT_AT_BURDEN_PERCENT: 7
MDC_IDC_STAT_BRADY_RA_PERCENT_PACED: 2
MDC_IDC_STAT_BRADY_RV_PERCENT_PACED: 47

## 2025-06-26 ENCOUNTER — PREP FOR SURGERY (OUTPATIENT)
Dept: OTHER | Facility: HOSPITAL | Age: 75
End: 2025-06-26
Payer: MEDICARE

## 2025-06-26 ENCOUNTER — TELEPHONE (OUTPATIENT)
Dept: GASTROENTEROLOGY | Facility: CLINIC | Age: 75
End: 2025-06-26

## 2025-06-26 DIAGNOSIS — K21.9 GASTROESOPHAGEAL REFLUX DISEASE WITHOUT ESOPHAGITIS: ICD-10-CM

## 2025-06-26 DIAGNOSIS — Z95.0 PACEMAKER: ICD-10-CM

## 2025-06-26 DIAGNOSIS — Z12.11 SPECIAL SCREENING FOR MALIGNANT NEOPLASMS, COLON: Primary | ICD-10-CM

## 2025-06-26 NOTE — TELEPHONE ENCOUNTER
Caller: Angela Tovar    Relationship to patient: Self    Best call back number: [phone]     Chief complaint: WILL BE ON A CRUISE    Type of visit: PROCEDURE    Requested date: AFTER 11/17/2025     If rescheduling, when is the original appointment: 11/13/2025

## (undated) DEVICE — CATH DIAG EXPO .045 AR1 5F 100CM

## (undated) DEVICE — ADULT, W/LG. BACK PAD, RADIOTRANSPARENT ELEMENT AND LEAD WIRE: Brand: DEFIBRILLATION ELECTRODES

## (undated) DEVICE — DECANT BG O JET

## (undated) DEVICE — MODEL BT2000 P/N 700287-012KIT CONTENTS: MANIFOLD WITH SALINE AND CONTRAST PORTS, SALINE TUBING WITH SPIKE AND HAND SYRINGE, TRANSDUCER: Brand: BT2000 AUTOMATED MANIFOLD KIT

## (undated) DEVICE — IRRIGATOR BULB ASEPTO 60CC STRL

## (undated) DEVICE — CATH DIAG EXPO .045 FL3  5F 100CM

## (undated) DEVICE — SET PRIMARY GRVTY 10DP MALE LL 104IN

## (undated) DEVICE — PK CATH CARD 10

## (undated) DEVICE — SKIN AFFIX SURG ADHESIVE 72/CS 0.55ML: Brand: MEDLINE

## (undated) DEVICE — GLIDESHEATH SLENDER STAINLESS STEEL KIT: Brand: GLIDESHEATH SLENDER

## (undated) DEVICE — SOL NACL 0.9PCT 1000ML

## (undated) DEVICE — CANN NASL CO2 DIVIDED A/

## (undated) DEVICE — DEV COMP RAD PRELUDESYNC 24CM

## (undated) DEVICE — CAUTERY TIP POLISHER: Brand: DEVON

## (undated) DEVICE — DRSNG SURESITE123 4X4.8IN

## (undated) DEVICE — LEX ELECTRO PHYSIOLOGY: Brand: MEDLINE INDUSTRIES, INC.

## (undated) DEVICE — PENCL E/S HNDSWCH ROCKRBTN HOLSTR 10FT

## (undated) DEVICE — MODEL AT P65, P/N 701554-001KIT CONTENTS: HAND CONTROLLER, 3-WAY HIGH-PRESSURE STOPCOCK WITH ROTATING END AND PREMIUM HIGH-PRESSURE TUBING: Brand: ANGIOTOUCH® KIT

## (undated) DEVICE — LIMB HOLDERS: Brand: DEROYAL

## (undated) DEVICE — ARM SLING II: Brand: DEROYAL

## (undated) DEVICE — CATH DIAG EXPO M/ PK 5F FL4/FR4 PIG

## (undated) DEVICE — INTRO TEAR AWAY/LVD W/SD PRT 7F 13CM

## (undated) DEVICE — GW PERIPH GUIDERIGHT STD/EXCHNG/J/TIP SS 0.035IN 5X260CM

## (undated) DEVICE — 3M™ STERI-STRIP™ REINFORCED ADHESIVE SKIN CLOSURES, R1547, 1/2 IN X 4 IN (12 MM X 100 MM), 6 STRIPS/ENVELOPE: Brand: 3M™ STERI-STRIP™

## (undated) DEVICE — INTRO TEAR AWAY/LVD W/SD PRT 6F 13CM

## (undated) DEVICE — TUBING, SUCTION, 1/4" X 10', STRAIGHT: Brand: MEDLINE

## (undated) DEVICE — DECANTER: Brand: UNBRANDED

## (undated) DEVICE — ST EXT IV SMARTSITE 2VLV SP M LL 5ML IV1

## (undated) DEVICE — CVR TRANSD FLX 3DIMEN 14X29.2CM LF STRL

## (undated) DEVICE — ST INF PRI SMRTSTE 20DRP 2VLV 24ML 117

## (undated) DEVICE — MEDI-VAC YANKAUER SUCTION HANDLE W/BULBOUS TIP: Brand: CARDINAL HEALTH